# Patient Record
Sex: MALE | Race: WHITE | NOT HISPANIC OR LATINO | Employment: FULL TIME | URBAN - METROPOLITAN AREA
[De-identification: names, ages, dates, MRNs, and addresses within clinical notes are randomized per-mention and may not be internally consistent; named-entity substitution may affect disease eponyms.]

---

## 2023-06-23 ENCOUNTER — PATIENT OUTREACH (OUTPATIENT)
Dept: CARE COORDINATION | Facility: CLINIC | Age: 73
End: 2023-06-23
Payer: MEDICARE

## 2023-06-23 NOTE — PROGRESS NOTES
Spoke with patient today to get scheduled for Annual Wellness Exam. Successfully scheduled with PCP office. Scheduled for 7/28/2023 with Dr. Morillo.     I introduced myself as the Patient Navigator. I am here to help with any obstacles that are in the way of receiving the proper care. I am able to assist with appointments issues, medication coverage issues, community programs which can include help with meals, medical supplies, senior programs, housing issues and/or transportation.    I gave my direct number [phone] if anything arises in the future they may contact me directly.       Contact on behalf of [facility] direct scheduling at [phone]

## 2023-07-25 ENCOUNTER — APPOINTMENT (OUTPATIENT)
Dept: PRIMARY CARE | Facility: CLINIC | Age: 73
End: 2023-07-25
Payer: MEDICARE

## 2023-08-01 ENCOUNTER — OFFICE VISIT (OUTPATIENT)
Dept: PRIMARY CARE | Facility: CLINIC | Age: 73
End: 2023-08-01
Payer: MEDICARE

## 2023-08-01 ENCOUNTER — LAB (OUTPATIENT)
Dept: LAB | Facility: LAB | Age: 73
End: 2023-08-01
Payer: MEDICARE

## 2023-08-01 VITALS
HEIGHT: 69 IN | DIASTOLIC BLOOD PRESSURE: 68 MMHG | HEART RATE: 70 BPM | WEIGHT: 181 LBS | SYSTOLIC BLOOD PRESSURE: 121 MMHG | BODY MASS INDEX: 26.81 KG/M2 | OXYGEN SATURATION: 95 %

## 2023-08-01 DIAGNOSIS — I10 HYPERTENSION, UNSPECIFIED TYPE: Primary | ICD-10-CM

## 2023-08-01 DIAGNOSIS — I10 HYPERTENSION, UNSPECIFIED TYPE: ICD-10-CM

## 2023-08-01 DIAGNOSIS — M10.9 GOUT, UNSPECIFIED CAUSE, UNSPECIFIED CHRONICITY, UNSPECIFIED SITE: ICD-10-CM

## 2023-08-01 PROBLEM — E78.00 HYPERCHOLESTEROLEMIA: Status: ACTIVE | Noted: 2023-08-01

## 2023-08-01 PROBLEM — K80.20 CHOLELITHIASIS: Status: ACTIVE | Noted: 2023-08-01

## 2023-08-01 LAB
ALANINE AMINOTRANSFERASE (SGPT) (U/L) IN SER/PLAS: 25 U/L (ref 10–52)
ALBUMIN (G/DL) IN SER/PLAS: 4.4 G/DL (ref 3.4–5)
ALKALINE PHOSPHATASE (U/L) IN SER/PLAS: 52 U/L (ref 33–136)
ANION GAP IN SER/PLAS: 16 MMOL/L (ref 10–20)
ASPARTATE AMINOTRANSFERASE (SGOT) (U/L) IN SER/PLAS: 19 U/L (ref 9–39)
BILIRUBIN TOTAL (MG/DL) IN SER/PLAS: 1.3 MG/DL (ref 0–1.2)
CALCIUM (MG/DL) IN SER/PLAS: 9.5 MG/DL (ref 8.6–10.6)
CARBON DIOXIDE, TOTAL (MMOL/L) IN SER/PLAS: 29 MMOL/L (ref 21–32)
CHLORIDE (MMOL/L) IN SER/PLAS: 100 MMOL/L (ref 98–107)
CHOLESTEROL (MG/DL) IN SER/PLAS: 143 MG/DL (ref 0–199)
CHOLESTEROL IN HDL (MG/DL) IN SER/PLAS: 75.1 MG/DL
CHOLESTEROL/HDL RATIO: 1.9
CREATININE (MG/DL) IN SER/PLAS: 0.99 MG/DL (ref 0.5–1.3)
GFR MALE: 80 ML/MIN/1.73M2
GLUCOSE (MG/DL) IN SER/PLAS: 91 MG/DL (ref 74–99)
LDL: 53 MG/DL (ref 0–99)
POTASSIUM (MMOL/L) IN SER/PLAS: 3.9 MMOL/L (ref 3.5–5.3)
PROTEIN TOTAL: 6.4 G/DL (ref 6.4–8.2)
SODIUM (MMOL/L) IN SER/PLAS: 141 MMOL/L (ref 136–145)
TRIGLYCERIDE (MG/DL) IN SER/PLAS: 77 MG/DL (ref 0–149)
URATE (MG/DL) IN SER/PLAS: 5.5 MG/DL (ref 4–7.5)
UREA NITROGEN (MG/DL) IN SER/PLAS: 37 MG/DL (ref 6–23)
VLDL: 15 MG/DL (ref 0–40)

## 2023-08-01 PROCEDURE — 84550 ASSAY OF BLOOD/URIC ACID: CPT

## 2023-08-01 PROCEDURE — G0439 PPPS, SUBSEQ VISIT: HCPCS | Performed by: FAMILY MEDICINE

## 2023-08-01 PROCEDURE — 1170F FXNL STATUS ASSESSED: CPT | Performed by: FAMILY MEDICINE

## 2023-08-01 PROCEDURE — 36415 COLL VENOUS BLD VENIPUNCTURE: CPT

## 2023-08-01 PROCEDURE — 3074F SYST BP LT 130 MM HG: CPT | Performed by: FAMILY MEDICINE

## 2023-08-01 PROCEDURE — G0444 DEPRESSION SCREEN ANNUAL: HCPCS | Performed by: FAMILY MEDICINE

## 2023-08-01 PROCEDURE — 90715 TDAP VACCINE 7 YRS/> IM: CPT | Performed by: FAMILY MEDICINE

## 2023-08-01 PROCEDURE — 90471 IMMUNIZATION ADMIN: CPT | Performed by: FAMILY MEDICINE

## 2023-08-01 PROCEDURE — 3078F DIAST BP <80 MM HG: CPT | Performed by: FAMILY MEDICINE

## 2023-08-01 PROCEDURE — 80053 COMPREHEN METABOLIC PANEL: CPT

## 2023-08-01 PROCEDURE — 80061 LIPID PANEL: CPT

## 2023-08-01 PROCEDURE — 99213 OFFICE O/P EST LOW 20 MIN: CPT | Performed by: FAMILY MEDICINE

## 2023-08-01 RX ORDER — ACETAMINOPHEN 500 MG
TABLET ORAL
COMMUNITY

## 2023-08-01 RX ORDER — ATORVASTATIN CALCIUM 40 MG/1
1 TABLET, FILM COATED ORAL NIGHTLY
COMMUNITY
Start: 2020-09-14 | End: 2024-04-04 | Stop reason: SDUPTHER

## 2023-08-01 RX ORDER — ALLOPURINOL 100 MG/1
100 TABLET ORAL 2 TIMES DAILY
Qty: 180 TABLET | Refills: 2 | Status: SHIPPED | OUTPATIENT
Start: 2023-08-01

## 2023-08-01 RX ORDER — TADALAFIL 20 MG/1
20 TABLET ORAL DAILY PRN
COMMUNITY

## 2023-08-01 RX ORDER — DOXYCYCLINE HYCLATE 20 MG
1 TABLET ORAL 2 TIMES DAILY
COMMUNITY
Start: 2021-12-22

## 2023-08-01 RX ORDER — METRONIDAZOLE 7.5 MG/G
GEL TOPICAL
COMMUNITY

## 2023-08-01 RX ORDER — COLCHICINE 0.6 MG/1
CAPSULE ORAL
COMMUNITY
Start: 2020-11-06

## 2023-08-01 RX ORDER — TOLTERODINE TARTRATE 2 MG/1
2 TABLET, EXTENDED RELEASE ORAL DAILY
COMMUNITY

## 2023-08-01 RX ORDER — ALLOPURINOL 100 MG/1
1 TABLET ORAL 2 TIMES DAILY
COMMUNITY
Start: 2020-11-06 | End: 2023-08-01 | Stop reason: SDUPTHER

## 2023-08-01 RX ORDER — NAPROXEN SODIUM 220 MG/1
TABLET, FILM COATED ORAL
COMMUNITY
Start: 2020-09-14

## 2023-08-01 RX ORDER — CHLORTHALIDONE 25 MG/1
1 TABLET ORAL DAILY
COMMUNITY
Start: 2020-07-29 | End: 2024-04-04 | Stop reason: SDUPTHER

## 2023-08-01 RX ORDER — DOXAZOSIN 4 MG/1
4 TABLET ORAL DAILY
COMMUNITY

## 2023-08-01 RX ORDER — GLUC/MSM/COLGN2/HYAL/ANTIARTH3 375-375-20
TABLET ORAL
COMMUNITY

## 2023-08-01 ASSESSMENT — ENCOUNTER SYMPTOMS
GASTROINTESTINAL NEGATIVE: 1
MUSCULOSKELETAL NEGATIVE: 1
CONSTITUTIONAL NEGATIVE: 1
PSYCHIATRIC NEGATIVE: 1
NEUROLOGICAL NEGATIVE: 1
CARDIOVASCULAR NEGATIVE: 1
RESPIRATORY NEGATIVE: 1

## 2023-08-01 ASSESSMENT — PATIENT HEALTH QUESTIONNAIRE - PHQ9
SUM OF ALL RESPONSES TO PHQ9 QUESTIONS 1 AND 2: 0
2. FEELING DOWN, DEPRESSED OR HOPELESS: NOT AT ALL
1. LITTLE INTEREST OR PLEASURE IN DOING THINGS: NOT AT ALL

## 2023-08-01 ASSESSMENT — ACTIVITIES OF DAILY LIVING (ADL)
GROCERY_SHOPPING: INDEPENDENT
DRESSING: INDEPENDENT
DOING_HOUSEWORK: INDEPENDENT
BATHING: INDEPENDENT
TAKING_MEDICATION: INDEPENDENT
MANAGING_FINANCES: INDEPENDENT

## 2023-08-01 NOTE — PROGRESS NOTES
Subjective   Patient ID: Art Elizabeth is a 73 y.o. male.    HPI  Doing well   Review of Systems   Constitutional: Negative.    HENT: Negative.     Respiratory: Negative.     Cardiovascular: Negative.    Gastrointestinal: Negative.    Genitourinary: Negative.    Musculoskeletal: Negative.    Skin: Negative.    Neurological: Negative.    Psychiatric/Behavioral: Negative.         Objective   Physical Exam  Constitutional:       Appearance: Normal appearance.   HENT:      Head: Normocephalic and atraumatic.      Nose: Nose normal.      Mouth/Throat:      Mouth: Mucous membranes are moist.   Eyes:      Extraocular Movements: Extraocular movements intact.      Pupils: Pupils are equal, round, and reactive to light.   Cardiovascular:      Rate and Rhythm: Normal rate and regular rhythm.      Pulses: Normal pulses.      Heart sounds: Normal heart sounds.   Pulmonary:      Effort: Pulmonary effort is normal.      Breath sounds: Normal breath sounds.   Abdominal:      General: Abdomen is flat. Bowel sounds are normal.      Palpations: Abdomen is soft.   Musculoskeletal:         General: Normal range of motion.      Cervical back: Normal range of motion.   Skin:     General: Skin is warm and dry.   Neurological:      General: No focal deficit present.      Mental Status: He is alert.   Psychiatric:         Mood and Affect: Mood normal.         Behavior: Behavior normal.         Thought Content: Thought content normal.         Judgment: Judgment normal.         Assessment/Plan   There are no diagnoses linked to this encounter.

## 2023-08-25 ENCOUNTER — OFFICE (OUTPATIENT)
Dept: URBAN - METROPOLITAN AREA CLINIC 26 | Facility: CLINIC | Age: 73
End: 2023-08-25
Payer: MEDICARE

## 2023-08-25 VITALS
HEART RATE: 65 BPM | DIASTOLIC BLOOD PRESSURE: 76 MMHG | HEIGHT: 69 IN | SYSTOLIC BLOOD PRESSURE: 132 MMHG | TEMPERATURE: 97.2 F | WEIGHT: 181 LBS

## 2023-08-25 DIAGNOSIS — Z79.82 LONG TERM (CURRENT) USE OF ASPIRIN: ICD-10-CM

## 2023-08-25 DIAGNOSIS — Z86.010 PERSONAL HISTORY OF COLONIC POLYPS: ICD-10-CM

## 2023-08-25 DIAGNOSIS — I10 ESSENTIAL (PRIMARY) HYPERTENSION: ICD-10-CM

## 2023-08-25 DIAGNOSIS — K57.90 DIVERTICULOSIS OF INTESTINE, PART UNSPECIFIED, WITHOUT PERFO: ICD-10-CM

## 2023-08-25 PROCEDURE — 99203 OFFICE O/P NEW LOW 30 MIN: CPT | Performed by: NURSE PRACTITIONER

## 2023-08-25 RX ORDER — ONDANSETRON HYDROCHLORIDE 4 MG/1
TABLET, FILM COATED ORAL
Qty: 3 | Refills: 0 | Status: ACTIVE
Start: 2023-08-25

## 2023-09-05 VITALS
OXYGEN SATURATION: 97 % | DIASTOLIC BLOOD PRESSURE: 46 MMHG | SYSTOLIC BLOOD PRESSURE: 108 MMHG | HEART RATE: 62 BPM | HEIGHT: 69 IN | SYSTOLIC BLOOD PRESSURE: 108 MMHG | DIASTOLIC BLOOD PRESSURE: 48 MMHG | HEART RATE: 83 BPM | HEIGHT: 69 IN | SYSTOLIC BLOOD PRESSURE: 100 MMHG | HEART RATE: 68 BPM | OXYGEN SATURATION: 95 % | OXYGEN SATURATION: 99 % | HEART RATE: 62 BPM | DIASTOLIC BLOOD PRESSURE: 47 MMHG | DIASTOLIC BLOOD PRESSURE: 67 MMHG | HEART RATE: 63 BPM | DIASTOLIC BLOOD PRESSURE: 42 MMHG | HEART RATE: 85 BPM | SYSTOLIC BLOOD PRESSURE: 88 MMHG | RESPIRATION RATE: 14 BRPM | HEART RATE: 59 BPM | SYSTOLIC BLOOD PRESSURE: 83 MMHG | DIASTOLIC BLOOD PRESSURE: 62 MMHG | OXYGEN SATURATION: 95 % | SYSTOLIC BLOOD PRESSURE: 96 MMHG | OXYGEN SATURATION: 97 % | HEART RATE: 63 BPM | HEART RATE: 82 BPM | WEIGHT: 185 LBS | DIASTOLIC BLOOD PRESSURE: 42 MMHG | DIASTOLIC BLOOD PRESSURE: 42 MMHG | SYSTOLIC BLOOD PRESSURE: 122 MMHG | DIASTOLIC BLOOD PRESSURE: 32 MMHG | SYSTOLIC BLOOD PRESSURE: 83 MMHG | OXYGEN SATURATION: 100 % | OXYGEN SATURATION: 100 % | HEART RATE: 85 BPM | HEART RATE: 60 BPM | OXYGEN SATURATION: 98 % | SYSTOLIC BLOOD PRESSURE: 103 MMHG | SYSTOLIC BLOOD PRESSURE: 82 MMHG | SYSTOLIC BLOOD PRESSURE: 86 MMHG | HEART RATE: 62 BPM | HEART RATE: 63 BPM | OXYGEN SATURATION: 92 % | SYSTOLIC BLOOD PRESSURE: 122 MMHG | HEART RATE: 61 BPM | DIASTOLIC BLOOD PRESSURE: 46 MMHG | OXYGEN SATURATION: 98 % | TEMPERATURE: 98.2 F | SYSTOLIC BLOOD PRESSURE: 103 MMHG | RESPIRATION RATE: 9 BRPM | HEART RATE: 61 BPM | RESPIRATION RATE: 11 BRPM | OXYGEN SATURATION: 92 % | SYSTOLIC BLOOD PRESSURE: 86 MMHG | HEART RATE: 59 BPM | SYSTOLIC BLOOD PRESSURE: 88 MMHG | DIASTOLIC BLOOD PRESSURE: 36 MMHG | HEART RATE: 82 BPM | DIASTOLIC BLOOD PRESSURE: 34 MMHG | RESPIRATION RATE: 12 BRPM | DIASTOLIC BLOOD PRESSURE: 67 MMHG | RESPIRATION RATE: 11 BRPM | DIASTOLIC BLOOD PRESSURE: 47 MMHG | OXYGEN SATURATION: 99 % | SYSTOLIC BLOOD PRESSURE: 96 MMHG | OXYGEN SATURATION: 97 % | DIASTOLIC BLOOD PRESSURE: 62 MMHG | RESPIRATION RATE: 28 BRPM | SYSTOLIC BLOOD PRESSURE: 122 MMHG | HEART RATE: 68 BPM | SYSTOLIC BLOOD PRESSURE: 100 MMHG | SYSTOLIC BLOOD PRESSURE: 101 MMHG | RESPIRATION RATE: 14 BRPM | OXYGEN SATURATION: 98 % | SYSTOLIC BLOOD PRESSURE: 88 MMHG | DIASTOLIC BLOOD PRESSURE: 47 MMHG | DIASTOLIC BLOOD PRESSURE: 36 MMHG | TEMPERATURE: 98.2 F | HEART RATE: 60 BPM | DIASTOLIC BLOOD PRESSURE: 38 MMHG | RESPIRATION RATE: 12 BRPM | OXYGEN SATURATION: 99 % | HEART RATE: 59 BPM | SYSTOLIC BLOOD PRESSURE: 82 MMHG | TEMPERATURE: 98.2 F | OXYGEN SATURATION: 92 % | RESPIRATION RATE: 10 BRPM | HEART RATE: 83 BPM | RESPIRATION RATE: 9 BRPM | SYSTOLIC BLOOD PRESSURE: 83 MMHG | HEART RATE: 82 BPM | SYSTOLIC BLOOD PRESSURE: 101 MMHG | WEIGHT: 185 LBS | SYSTOLIC BLOOD PRESSURE: 96 MMHG | SYSTOLIC BLOOD PRESSURE: 91 MMHG | WEIGHT: 185 LBS | DIASTOLIC BLOOD PRESSURE: 32 MMHG | SYSTOLIC BLOOD PRESSURE: 103 MMHG | RESPIRATION RATE: 28 BRPM | DIASTOLIC BLOOD PRESSURE: 48 MMHG | DIASTOLIC BLOOD PRESSURE: 32 MMHG | RESPIRATION RATE: 10 BRPM | DIASTOLIC BLOOD PRESSURE: 46 MMHG | HEART RATE: 61 BPM | DIASTOLIC BLOOD PRESSURE: 62 MMHG | HEART RATE: 85 BPM | SYSTOLIC BLOOD PRESSURE: 82 MMHG | DIASTOLIC BLOOD PRESSURE: 48 MMHG | RESPIRATION RATE: 14 BRPM | SYSTOLIC BLOOD PRESSURE: 91 MMHG | SYSTOLIC BLOOD PRESSURE: 86 MMHG | RESPIRATION RATE: 10 BRPM | DIASTOLIC BLOOD PRESSURE: 34 MMHG | RESPIRATION RATE: 11 BRPM | HEART RATE: 83 BPM | RESPIRATION RATE: 9 BRPM | RESPIRATION RATE: 12 BRPM | DIASTOLIC BLOOD PRESSURE: 36 MMHG | OXYGEN SATURATION: 95 % | OXYGEN SATURATION: 100 % | SYSTOLIC BLOOD PRESSURE: 100 MMHG | DIASTOLIC BLOOD PRESSURE: 38 MMHG | HEIGHT: 69 IN | RESPIRATION RATE: 28 BRPM | SYSTOLIC BLOOD PRESSURE: 108 MMHG | DIASTOLIC BLOOD PRESSURE: 34 MMHG | SYSTOLIC BLOOD PRESSURE: 91 MMHG | SYSTOLIC BLOOD PRESSURE: 101 MMHG | HEART RATE: 60 BPM | DIASTOLIC BLOOD PRESSURE: 67 MMHG | DIASTOLIC BLOOD PRESSURE: 38 MMHG | HEART RATE: 68 BPM

## 2023-09-08 ENCOUNTER — AMBULATORY SURGICAL CENTER (OUTPATIENT)
Dept: URBAN - METROPOLITAN AREA SURGERY 12 | Facility: SURGERY | Age: 73
End: 2023-09-08
Payer: MEDICARE

## 2023-09-08 ENCOUNTER — OFFICE (OUTPATIENT)
Dept: URBAN - METROPOLITAN AREA PATHOLOGY 2 | Facility: PATHOLOGY | Age: 73
End: 2023-09-08
Payer: MEDICARE

## 2023-09-08 DIAGNOSIS — K63.5 POLYP OF COLON: ICD-10-CM

## 2023-09-08 DIAGNOSIS — Z86.010 PERSONAL HISTORY OF COLONIC POLYPS: ICD-10-CM

## 2023-09-08 DIAGNOSIS — K57.30 DIVERTICULOSIS OF LARGE INTESTINE WITHOUT PERFORATION OR ABS: ICD-10-CM

## 2023-09-08 DIAGNOSIS — K64.4 RESIDUAL HEMORRHOIDAL SKIN TAGS: ICD-10-CM

## 2023-09-08 DIAGNOSIS — Z09 ENCOUNTER FOR FOLLOW-UP EXAMINATION AFTER COMPLETED TREATMEN: ICD-10-CM

## 2023-09-08 PROCEDURE — 88305 TISSUE EXAM BY PATHOLOGIST: CPT | Mod: TC | Performed by: PATHOLOGY

## 2023-09-08 PROCEDURE — 45384 COLONOSCOPY W/LESION REMOVAL: CPT | Mod: 59 | Performed by: INTERNAL MEDICINE

## 2023-09-08 PROCEDURE — 45385 COLONOSCOPY W/LESION REMOVAL: CPT | Performed by: INTERNAL MEDICINE

## 2024-04-04 DIAGNOSIS — E78.00 HYPERCHOLESTEROLEMIA: ICD-10-CM

## 2024-04-04 DIAGNOSIS — I10 BENIGN ESSENTIAL HTN: ICD-10-CM

## 2024-04-06 RX ORDER — ATORVASTATIN CALCIUM 40 MG/1
40 TABLET, FILM COATED ORAL NIGHTLY
Qty: 90 TABLET | Refills: 3 | Status: SHIPPED | OUTPATIENT
Start: 2024-04-06 | End: 2025-04-06

## 2024-04-06 RX ORDER — CHLORTHALIDONE 25 MG/1
25 TABLET ORAL DAILY
Qty: 90 TABLET | Refills: 3 | Status: SHIPPED | OUTPATIENT
Start: 2024-04-06 | End: 2025-04-06

## 2024-08-08 ENCOUNTER — LAB (OUTPATIENT)
Dept: LAB | Facility: LAB | Age: 74
End: 2024-08-08
Payer: MEDICARE

## 2024-08-08 ENCOUNTER — HOSPITAL ENCOUNTER (OUTPATIENT)
Dept: RADIOLOGY | Facility: CLINIC | Age: 74
Discharge: HOME | End: 2024-08-08
Payer: MEDICARE

## 2024-08-08 ENCOUNTER — APPOINTMENT (OUTPATIENT)
Dept: PRIMARY CARE | Facility: CLINIC | Age: 74
End: 2024-08-08
Payer: MEDICARE

## 2024-08-08 VITALS
HEIGHT: 68 IN | OXYGEN SATURATION: 95 % | WEIGHT: 179 LBS | BODY MASS INDEX: 27.13 KG/M2 | HEART RATE: 74 BPM | SYSTOLIC BLOOD PRESSURE: 146 MMHG | DIASTOLIC BLOOD PRESSURE: 76 MMHG

## 2024-08-08 DIAGNOSIS — M10.9 GOUT, UNSPECIFIED CAUSE, UNSPECIFIED CHRONICITY, UNSPECIFIED SITE: ICD-10-CM

## 2024-08-08 DIAGNOSIS — M54.50 ACUTE BILATERAL LOW BACK PAIN WITHOUT SCIATICA: ICD-10-CM

## 2024-08-08 DIAGNOSIS — G47.30 SLEEP APNEA, UNSPECIFIED TYPE: ICD-10-CM

## 2024-08-08 DIAGNOSIS — I10 HYPERTENSION, UNSPECIFIED TYPE: Primary | ICD-10-CM

## 2024-08-08 DIAGNOSIS — I10 HYPERTENSION, UNSPECIFIED TYPE: ICD-10-CM

## 2024-08-08 LAB
ALBUMIN SERPL BCP-MCNC: 4.1 G/DL (ref 3.4–5)
ALP SERPL-CCNC: 60 U/L (ref 33–136)
ALT SERPL W P-5'-P-CCNC: 40 U/L (ref 10–52)
ANION GAP SERPL CALC-SCNC: 12 MMOL/L (ref 10–20)
AST SERPL W P-5'-P-CCNC: 29 U/L (ref 9–39)
BILIRUB SERPL-MCNC: 1.5 MG/DL (ref 0–1.2)
BUN SERPL-MCNC: 23 MG/DL (ref 6–23)
CALCIUM SERPL-MCNC: 10 MG/DL (ref 8.6–10.6)
CHLORIDE SERPL-SCNC: 100 MMOL/L (ref 98–107)
CHOLEST SERPL-MCNC: 149 MG/DL (ref 0–199)
CHOLESTEROL/HDL RATIO: 2
CO2 SERPL-SCNC: 33 MMOL/L (ref 21–32)
CREAT SERPL-MCNC: 0.94 MG/DL (ref 0.5–1.3)
EGFRCR SERPLBLD CKD-EPI 2021: 85 ML/MIN/1.73M*2
GLUCOSE SERPL-MCNC: 102 MG/DL (ref 74–99)
HDLC SERPL-MCNC: 76.2 MG/DL
LDLC SERPL CALC-MCNC: 58 MG/DL
NON HDL CHOLESTEROL: 73 MG/DL (ref 0–149)
POTASSIUM SERPL-SCNC: 4 MMOL/L (ref 3.5–5.3)
PROT SERPL-MCNC: 6.7 G/DL (ref 6.4–8.2)
SODIUM SERPL-SCNC: 141 MMOL/L (ref 136–145)
TRIGL SERPL-MCNC: 75 MG/DL (ref 0–149)
URATE SERPL-MCNC: 4.8 MG/DL (ref 4–7.5)
VLDL: 15 MG/DL (ref 0–40)

## 2024-08-08 PROCEDURE — 1170F FXNL STATUS ASSESSED: CPT | Performed by: FAMILY MEDICINE

## 2024-08-08 PROCEDURE — 71046 X-RAY EXAM CHEST 2 VIEWS: CPT

## 2024-08-08 PROCEDURE — 84550 ASSAY OF BLOOD/URIC ACID: CPT

## 2024-08-08 PROCEDURE — 3008F BODY MASS INDEX DOCD: CPT | Performed by: FAMILY MEDICINE

## 2024-08-08 PROCEDURE — 36415 COLL VENOUS BLD VENIPUNCTURE: CPT

## 2024-08-08 PROCEDURE — 80053 COMPREHEN METABOLIC PANEL: CPT

## 2024-08-08 PROCEDURE — 1158F ADVNC CARE PLAN TLK DOCD: CPT | Performed by: FAMILY MEDICINE

## 2024-08-08 PROCEDURE — 1159F MED LIST DOCD IN RCRD: CPT | Performed by: FAMILY MEDICINE

## 2024-08-08 PROCEDURE — 3078F DIAST BP <80 MM HG: CPT | Performed by: FAMILY MEDICINE

## 2024-08-08 PROCEDURE — 80061 LIPID PANEL: CPT

## 2024-08-08 PROCEDURE — G0439 PPPS, SUBSEQ VISIT: HCPCS | Performed by: FAMILY MEDICINE

## 2024-08-08 PROCEDURE — 1123F ACP DISCUSS/DSCN MKR DOCD: CPT | Performed by: FAMILY MEDICINE

## 2024-08-08 PROCEDURE — G0444 DEPRESSION SCREEN ANNUAL: HCPCS | Performed by: FAMILY MEDICINE

## 2024-08-08 PROCEDURE — 1036F TOBACCO NON-USER: CPT | Performed by: FAMILY MEDICINE

## 2024-08-08 PROCEDURE — 72110 X-RAY EXAM L-2 SPINE 4/>VWS: CPT

## 2024-08-08 PROCEDURE — 99497 ADVNCD CARE PLAN 30 MIN: CPT | Performed by: FAMILY MEDICINE

## 2024-08-08 PROCEDURE — 3077F SYST BP >= 140 MM HG: CPT | Performed by: FAMILY MEDICINE

## 2024-08-08 PROCEDURE — 99214 OFFICE O/P EST MOD 30 MIN: CPT | Performed by: FAMILY MEDICINE

## 2024-08-08 RX ORDER — ALLOPURINOL 100 MG/1
100 TABLET ORAL 2 TIMES DAILY
Qty: 180 TABLET | Refills: 2 | Status: SHIPPED | OUTPATIENT
Start: 2024-08-08

## 2024-08-08 RX ORDER — ASCORBIC ACID 500 MG
500 TABLET ORAL
COMMUNITY

## 2024-08-08 RX ORDER — LORATADINE 10 MG/1
10 TABLET ORAL DAILY
COMMUNITY

## 2024-08-08 ASSESSMENT — PATIENT HEALTH QUESTIONNAIRE - PHQ9
2. FEELING DOWN, DEPRESSED OR HOPELESS: NOT AT ALL
SUM OF ALL RESPONSES TO PHQ9 QUESTIONS 1 AND 2: 0
1. LITTLE INTEREST OR PLEASURE IN DOING THINGS: NOT AT ALL

## 2024-08-08 ASSESSMENT — ENCOUNTER SYMPTOMS
BACK PAIN: 1
RESPIRATORY NEGATIVE: 1
HEMATOLOGIC/LYMPHATIC NEGATIVE: 1
GASTROINTESTINAL NEGATIVE: 1
CONSTITUTIONAL NEGATIVE: 1
ARTHRALGIAS: 1
CARDIOVASCULAR NEGATIVE: 1
FREQUENCY: 1
NEUROLOGICAL NEGATIVE: 1

## 2024-08-08 ASSESSMENT — ACTIVITIES OF DAILY LIVING (ADL)
MANAGING_FINANCES: INDEPENDENT
GROCERY_SHOPPING: INDEPENDENT
DOING_HOUSEWORK: INDEPENDENT
TAKING_MEDICATION: INDEPENDENT
BATHING: INDEPENDENT
DRESSING: INDEPENDENT

## 2024-08-08 NOTE — PROGRESS NOTES
Subjective   Patient ID: Art Elizabeth is a 74 y.o. male who presents for Medicare Annual Wellness Visit Subsequent.  HPI  Umbilical hernia gallstones low back pain x 2-3 days exertional dyspnea  Patient being seen for chief complaint being addressed we also needed to review patient's past medical history due to his complex medical problems we went over his significant other medical issues individually reviewed his medications and did an overview of his past labs.  And medications  Discussed depression screen with patient for 5 min   Discussed living will and DNR with patient and spent 16 min doing so. Pts questions and concerns reviewed.   Patient having issues with sleep feels tired in the morning feels is not getting a significant amount of rest.  Needs follow-up sleep study to rule out sleep apnea    Review of Systems   Constitutional: Negative.    HENT: Negative.     Respiratory: Negative.          Exertional dyspnea   Cardiovascular: Negative.    Gastrointestinal: Negative.    Genitourinary:  Positive for frequency.   Musculoskeletal:  Positive for arthralgias and back pain.   Neurological: Negative.    Hematological: Negative.        Objective   Physical Exam  Constitutional:       Appearance: Normal appearance.   HENT:      Head: Normocephalic.   Cardiovascular:      Rate and Rhythm: Normal rate and regular rhythm.   Pulmonary:      Effort: Pulmonary effort is normal.   Musculoskeletal:         General: Normal range of motion.   Skin:     General: Skin is warm and dry.   Neurological:      Mental Status: He is alert.         Assessment/Plan   Problem List Items Addressed This Visit    None  Visit Diagnoses         Codes    Hypertension, unspecified type    -  Primary I10    Relevant Orders    Comprehensive Metabolic Panel    Lipid Panel    Gout, unspecified cause, unspecified chronicity, unspecified site     M10.9    Relevant Orders    Uric acid    Acute bilateral low back pain without sciatica     M54.50     Relevant Orders    XR chest 2 views    XR lumbar spine 2-3 views                 Ronnie Morillo,  08/08/24 8:51 AM

## 2024-08-14 PROBLEM — R79.89 ELEVATED LIVER FUNCTION TESTS: Status: ACTIVE | Noted: 2024-08-14

## 2024-08-14 PROBLEM — I25.10 ARTERIOSCLEROSIS OF CORONARY ARTERY: Chronic | Status: ACTIVE | Noted: 2024-08-14

## 2024-08-14 PROBLEM — E78.00 HYPERCHOLESTEROLEMIA: Chronic | Status: ACTIVE | Noted: 2023-08-01

## 2024-08-14 PROBLEM — C85.90 NON-HODGKIN'S LYMPHOMA (MULTI): Status: ACTIVE | Noted: 2024-08-14

## 2024-08-14 PROBLEM — R60.0 LEG EDEMA: Status: ACTIVE | Noted: 2024-08-14

## 2024-08-14 PROBLEM — F32.A DEPRESSION: Status: ACTIVE | Noted: 2024-08-14

## 2024-08-14 PROBLEM — I25.10 ARTERIOSCLEROSIS OF CORONARY ARTERY: Status: ACTIVE | Noted: 2024-08-14

## 2024-08-14 PROBLEM — R53.83 FATIGUE: Status: ACTIVE | Noted: 2024-08-14

## 2024-09-05 ENCOUNTER — TELEPHONE (OUTPATIENT)
Dept: PRIMARY CARE | Facility: CLINIC | Age: 74
End: 2024-09-05

## 2024-09-05 ENCOUNTER — OFFICE VISIT (OUTPATIENT)
Dept: CARDIOLOGY | Facility: CLINIC | Age: 74
End: 2024-09-05
Payer: MEDICARE

## 2024-09-05 VITALS
HEIGHT: 68 IN | HEART RATE: 81 BPM | SYSTOLIC BLOOD PRESSURE: 144 MMHG | WEIGHT: 182 LBS | BODY MASS INDEX: 27.58 KG/M2 | OXYGEN SATURATION: 96 % | DIASTOLIC BLOOD PRESSURE: 90 MMHG

## 2024-09-05 DIAGNOSIS — R60.0 LEG EDEMA: ICD-10-CM

## 2024-09-05 DIAGNOSIS — I10 BENIGN ESSENTIAL HTN: ICD-10-CM

## 2024-09-05 DIAGNOSIS — I25.10 ARTERIOSCLEROSIS OF CORONARY ARTERY: Primary | Chronic | ICD-10-CM

## 2024-09-05 DIAGNOSIS — I10 BENIGN ESSENTIAL HYPERTENSION: Chronic | ICD-10-CM

## 2024-09-05 DIAGNOSIS — E78.00 HYPERCHOLESTEROLEMIA: Chronic | ICD-10-CM

## 2024-09-05 DIAGNOSIS — R06.02 SOB (SHORTNESS OF BREATH) ON EXERTION: Chronic | ICD-10-CM

## 2024-09-05 PROBLEM — C85.90 NON-HODGKIN'S LYMPHOMA (MULTI): Chronic | Status: ACTIVE | Noted: 2024-08-14

## 2024-09-05 PROCEDURE — 99214 OFFICE O/P EST MOD 30 MIN: CPT | Performed by: INTERNAL MEDICINE

## 2024-09-05 PROCEDURE — 1160F RVW MEDS BY RX/DR IN RCRD: CPT | Performed by: INTERNAL MEDICINE

## 2024-09-05 PROCEDURE — 93010 ELECTROCARDIOGRAM REPORT: CPT | Performed by: INTERNAL MEDICINE

## 2024-09-05 PROCEDURE — 3077F SYST BP >= 140 MM HG: CPT | Performed by: INTERNAL MEDICINE

## 2024-09-05 PROCEDURE — 93005 ELECTROCARDIOGRAM TRACING: CPT | Performed by: INTERNAL MEDICINE

## 2024-09-05 PROCEDURE — 1123F ACP DISCUSS/DSCN MKR DOCD: CPT | Performed by: INTERNAL MEDICINE

## 2024-09-05 PROCEDURE — 3080F DIAST BP >= 90 MM HG: CPT | Performed by: INTERNAL MEDICINE

## 2024-09-05 PROCEDURE — 1036F TOBACCO NON-USER: CPT | Performed by: INTERNAL MEDICINE

## 2024-09-05 PROCEDURE — 1159F MED LIST DOCD IN RCRD: CPT | Performed by: INTERNAL MEDICINE

## 2024-09-05 PROCEDURE — 3008F BODY MASS INDEX DOCD: CPT | Performed by: INTERNAL MEDICINE

## 2024-09-05 RX ORDER — CHLORTHALIDONE 25 MG/1
25 TABLET ORAL DAILY
Qty: 90 TABLET | Refills: 3 | Status: SHIPPED | OUTPATIENT
Start: 2024-09-05 | End: 2025-09-05

## 2024-09-05 RX ORDER — ATORVASTATIN CALCIUM 40 MG/1
40 TABLET, FILM COATED ORAL NIGHTLY
Qty: 90 TABLET | Refills: 3 | Status: SHIPPED | OUTPATIENT
Start: 2024-09-05 | End: 2025-09-05

## 2024-09-05 NOTE — PROGRESS NOTES
Referred by No ref. provider found    HPI I am seeing [facility] for the first time in 2 and half years.  Overall feeling okay.  Still with fatigue.  Not exercising as much.  Still has baseline shortness of breath.  No chest pain.  Now is anticipating umbilical hernia surgery.    Past Medical History:  Problem List Items Addressed This Visit    None     Past Medical History:   Diagnosis Date    Arteriosclerosis of coronary artery 08/14/2024    Elevated  Agatston units, s/p SHAWNA to large Ramus 10/23/2020      Calculus of kidney 06/18/2021    Kidney stones, calcium oxalate    Hypercholesterolemia 08/01/2023    Dr. Lara to follow      Personal history of non-Hodgkin lymphomas 06/22/2022    History of lymphoma    Personal history of other diseases of the circulatory system     History of hypertension    Presence of coronary angioplasty implant and graft 06/22/2022    History of heart artery stent    SOB (shortness of breath) on exertion 12/16/2010    s/p CHOP 2001      Unspecified abdominal hernia without obstruction or gangrene     Hernia      Past Surgical History:  He has a past surgical history that includes Other surgical history (07/29/2020); Other surgical history (07/29/2020); Other surgical history (05/02/2022); Other surgical history (04/30/2021); and Other surgical history (04/30/2021).      Social History:  He reports that he has never smoked. He has never been exposed to tobacco smoke. He has never used smokeless tobacco. He reports that he does not drink alcohol and does not use drugs.    Family History:  No family history on file.     Allergies:  Meperidine and Meperidine (pf)    Outpatient Medications:  Current Outpatient Medications   Medication Instructions    allopurinol (ZYLOPRIM) 100 mg, oral, 2 times daily    ascorbic acid (VITAMIN C) 500 mg, oral, Daily RT    aspirin 81 mg chewable tablet oral, Daily RT    atorvastatin (LIPITOR) 40 mg, oral, Nightly    chlorthalidone (HYGROTON) 25 mg, oral, Daily     cholecalciferol (Vitamin D3) 50 mcg (2,000 unit) capsule oral    colchicine (Mitigare) 0.6 mg capsule capsule oral    doxazosin (CARDURA) 4 mg, oral, Daily    doxycycline (Periostat) 20 mg tablet 1 tablet, oral, 2 times daily    ferrous gluconate 236 mg (27 mg iron) tablet oral    loratadine (CLARITIN) 10 mg, oral, Daily    MEN'S MULTI-VITAMIN ORAL 1 tablet, oral, Daily    metroNIDAZOLE (Metrogel) 0.75 % gel apply a thin layer to face every night.    tadalafil (CIALIS) 20 mg, oral, Daily PRN    tolterodine (Detrol) 2 mg tablet 2 tablets, oral, Daily     Last Recorded Vitals:  There were no vitals filed for this visit.    Physical Exam  Patient is alert and oriented x3.  HEENT is unremarkable mucous members are moist  Neck no JVP no bruits upstrokes are full no thyromegaly  Lungs are clear bilaterally.  No wheezing crackles or rales  Heart regular rhythm normal S1-S2 there is no S3 no murmurs are heard.  Abdomen is soft bs are positive nontender nondistended no organomegaly no pulsatile masses  Extremities have no edema.  Distal pulses present palpable.  Neuro is grossly nonfocal  Skin has no rashes     Last Labs:  CBC -  Lab Results   Component Value Date    WBC 6.1 10/21/2021    HGB 14.1 10/21/2021    HCT 41.8 10/21/2021    MCV 96 10/21/2021     10/21/2021     CMP -  Lab Results   Component Value Date    CALCIUM 10.0 08/08/2024    PROT 6.7 08/08/2024    ALBUMIN 4.1 08/08/2024    AST 29 08/08/2024    ALT 40 08/08/2024    ALKPHOS 60 08/08/2024    BILITOT 1.5 (H) 08/08/2024     LIPID PANEL -   Lab Results   Component Value Date    CHOL 149 08/08/2024    HDL 76.2 08/08/2024    CHHDL 2.0 08/08/2024    VLDL 15 08/08/2024    TRIG 75 08/08/2024    NHDL 73 08/08/2024     RENAL FUNCTION PANEL -   Lab Results   Component Value Date    K 4.0 08/08/2024     Lab Results   Component Value Date    BNP 11 08/21/2020   Procedure  MET STRESS TEST [11/18/2021]: Normal cardiopulmonary response patterns to exercise. Peak VO2  max 25ml/kg/min     PFTs [07/15/2021]: No obstruction / restriction / bronchodilator response. No air trapping. DLCO normal.     PTCA [10/23/2020, Dr. Hari Ortiz]: Successful angioplasty and stenting to severe stenosis in large ramus intermedius, drug-eluting stent.      CATH [10/23/2020, Dr. Mariaelena Lara]: Severe dz in ramus intermedius involving the proximal portion and mid portion. In addition, there is a severe stenosis of the distal apical LAD prior to the terminal bifurcation. Recommendations: Dr. Ortiz will proceed with PCI and stenting of the ramus intermedius. The distal LAD prior to the terminal bifurcation will be managed medically as there is very little myocardium at risk.      EX NST [09/02/2020]: 7 min 11 sec (8.8 METs) ... Normal â€“ 55%      ONC ECHO [09/02/2020]: EF 60-65%. SD = impaired relaxation pattern. Avg GLS -17.5 indicating normal LV function.     CT / SCORING [08/28/2020] = 827.51 (LM 0, .42, LCx 0, RCA 46.09). Sequela of remote granulomatous infection in the left hemithorax.        Assessment/Plan      1. CAD. Elevated calcium score 827 units.  Catheterization 2020 revealed an 80% ramus lesion which was stented.  No change in his shortness of breath after the stenting.  He continues to have the same shortness of breath.  Cardiopulmonary stress test revealed adequate VO2 max of 25 mL/kg/min.  Normal ventilatory function.  Etiology of the shortness of breath remains unclear.  I have asked for him to have an EKG today.  Exercise nuclear stress test.       2. Hypertension.  Typically better controlled than it is now.  Have asked for him to monitor his blood pressures at home once or twice a week.  He should write these numbers down     3. Hyperlipidemia.  8/8/2024 LDL 58 HDL 76 triglycerides 75 LFTs normal     4.  Preoperative clearance he is scheduling umbilical hernia surgery.  Risk moderately elevated but acceptable.  Okay to hold aspirin 5 days prior to surgery    EKG today exercise  nuclear stress test before the surgery.  He will call 1 week after the stress test for final clearance.  Return to see me 1 year    Mariaelena Lara MD     Instructions and follow up

## 2024-09-05 NOTE — TELEPHONE ENCOUNTER
Patient is unable to schedule his sleep study due to office notes not stating why he needs a sleep study. Are you willing to addend the office notes from 8/8/24 to list the reason ?

## 2024-09-05 NOTE — PATIENT INSTRUCTIONS
1. CAD. Elevated calcium score 827 units.  Catheterization 2020 revealed an 80% ramus lesion which was stented.  No change in his shortness of breath after the stenting.  He continues to have the same shortness of breath.  Cardiopulmonary stress test revealed adequate VO2 max of 25 mL/kg/min.  Normal ventilatory function.  Etiology of the shortness of breath remains unclear.  I have asked for him to have an EKG today.  Exercise nuclear stress test.       2. Hypertension.  Typically better controlled than it is now.  Have asked for him to monitor his blood pressures at home once or twice a week.  He should write these numbers down     3. Hyperlipidemia.  8/8/2024 LDL 58 HDL 76 triglycerides 75 LFTs normal     4.  Preoperative clearance he is scheduling umbilical hernia surgery.  Risk moderately elevated but acceptable.  Okay to hold aspirin 5 days prior to surgery    EKG today exercise nuclear stress test before the surgery.  He will call 1 week after the stress test for final clearance.  Return to see me 1 year

## 2024-09-20 ENCOUNTER — HOSPITAL ENCOUNTER (OUTPATIENT)
Dept: RADIOLOGY | Facility: CLINIC | Age: 74
Discharge: HOME | End: 2024-09-20
Payer: MEDICARE

## 2024-09-20 ENCOUNTER — HOSPITAL ENCOUNTER (OUTPATIENT)
Dept: CARDIOLOGY | Facility: CLINIC | Age: 74
Discharge: HOME | End: 2024-09-20
Payer: MEDICARE

## 2024-09-20 DIAGNOSIS — R06.02 SOB (SHORTNESS OF BREATH) ON EXERTION: Chronic | ICD-10-CM

## 2024-09-20 DIAGNOSIS — I10 BENIGN ESSENTIAL HYPERTENSION: Chronic | ICD-10-CM

## 2024-09-20 DIAGNOSIS — I25.10 ARTERIOSCLEROSIS OF CORONARY ARTERY: Chronic | ICD-10-CM

## 2024-09-20 PROCEDURE — 3430000001 HC RX 343 DIAGNOSTIC RADIOPHARMACEUTICALS: Performed by: INTERNAL MEDICINE

## 2024-09-20 PROCEDURE — A9502 TC99M TETROFOSMIN: HCPCS | Performed by: INTERNAL MEDICINE

## 2024-09-20 PROCEDURE — 93017 CV STRESS TEST TRACING ONLY: CPT

## 2024-09-20 PROCEDURE — 78452 HT MUSCLE IMAGE SPECT MULT: CPT

## 2024-09-25 ENCOUNTER — PREP FOR PROCEDURE (OUTPATIENT)
Dept: SURGERY | Facility: CLINIC | Age: 74
End: 2024-09-25

## 2024-09-25 ENCOUNTER — APPOINTMENT (OUTPATIENT)
Dept: SURGERY | Facility: CLINIC | Age: 74
End: 2024-09-25
Payer: MEDICARE

## 2024-09-25 VITALS
WEIGHT: 183 LBS | HEART RATE: 75 BPM | DIASTOLIC BLOOD PRESSURE: 67 MMHG | TEMPERATURE: 97.8 F | OXYGEN SATURATION: 97 % | SYSTOLIC BLOOD PRESSURE: 129 MMHG | BODY MASS INDEX: 27.74 KG/M2 | HEIGHT: 68 IN | RESPIRATION RATE: 17 BRPM

## 2024-09-25 DIAGNOSIS — K43.9 ABDOMINAL WALL HERNIA: Primary | ICD-10-CM

## 2024-09-25 PROCEDURE — 3078F DIAST BP <80 MM HG: CPT | Performed by: SURGERY

## 2024-09-25 PROCEDURE — 1036F TOBACCO NON-USER: CPT | Performed by: SURGERY

## 2024-09-25 PROCEDURE — 3074F SYST BP LT 130 MM HG: CPT | Performed by: SURGERY

## 2024-09-25 PROCEDURE — 1159F MED LIST DOCD IN RCRD: CPT | Performed by: SURGERY

## 2024-09-25 PROCEDURE — 1123F ACP DISCUSS/DSCN MKR DOCD: CPT | Performed by: SURGERY

## 2024-09-25 PROCEDURE — 99214 OFFICE O/P EST MOD 30 MIN: CPT | Performed by: SURGERY

## 2024-09-25 PROCEDURE — 3008F BODY MASS INDEX DOCD: CPT | Performed by: SURGERY

## 2024-09-25 RX ORDER — SODIUM CHLORIDE, SODIUM LACTATE, POTASSIUM CHLORIDE, CALCIUM CHLORIDE 600; 310; 30; 20 MG/100ML; MG/100ML; MG/100ML; MG/100ML
20 INJECTION, SOLUTION INTRAVENOUS CONTINUOUS
OUTPATIENT
Start: 2024-09-25

## 2024-09-25 RX ORDER — CEFAZOLIN SODIUM 2 G/100ML
2 INJECTION, SOLUTION INTRAVENOUS ONCE
OUTPATIENT
Start: 2024-09-25 | End: 2024-09-25

## 2024-09-25 NOTE — LETTER
September 25, 2024     Ronnie Morillo,   [address]  [facility] [address] [address]    Patient: Art Elizabeth   YOB: 1950   Date of Visit: 9/25/2024       Dear Dr. Ronnie Morillo, :    Thank you for referring Art Elizabeth to me for evaluation. Below are my notes for this consultation.  If you have questions, please do not hesitate to call me. I look forward to following your patient along with you.       Sincerely,     Patrick Guaman MD      CC: No Recipients  ______________________________________________________________________________________    History Of Present Illness  HPI   The patient is a 74-year-old male with a history of cholelithiasis.  I had previously evaluated the patient in June 2022 at which time he was asymptomatic.  He also had an epigastric hernia which was reducible and asymptomatic.  The patient reports that the hernia has become larger and now causes intermittent discomfort.  He has no problems with right upper quadrant abdominal pain.  No nausea or vomiting.  No food intolerance.  No jaundice.    Past medical history:  Coronary disease status post stent placement in 2020.  Follows with Dr. Lara.    Non-Hodgkin's lymphoma treated with chemotherapy in 2001.  Inguinal hernia repair 1970  Kidney stone in 2021  Hypertension    Past Medical History  He has a past medical history of Arteriosclerosis of coronary artery (08/14/2024), Benign essential hypertension (08/01/2023), Calculus of kidney (06/18/2021), Hypercholesterolemia (08/01/2023), Non-Hodgkin's lymphoma (Multi) (08/14/2024), Personal history of non-Hodgkin lymphomas (06/22/2022), Personal history of other diseases of the circulatory system, Presence of coronary angioplasty implant and graft (06/22/2022), SOB (shortness of breath) on exertion (12/16/2010), and Unspecified abdominal hernia without obstruction or gangrene.    Surgical History  He has a past surgical history that includes Other  "surgical history (07/29/2020); Other surgical history (07/29/2020); Other surgical history (05/02/2022); Other surgical history (04/30/2021); and Other surgical history (04/30/2021).     Allergies  Meperidine and Meperidine (pf)    Social History  He reports that he has never smoked. He has never been exposed to tobacco smoke. He has never used smokeless tobacco. He reports that he does not drink alcohol and does not use drugs.    Family History  No family history on file.    Last Recorded Vitals  Blood pressure 129/67, pulse 75, temperature 36.6 °C (97.8 °F), temperature source Temporal, resp. rate 17, height 1.727 m (5' 8\"), weight 83 kg (183 lb), SpO2 97%.    Physical Exam  Constitutional: Well-developed, well-nourished, alert and oriented, no acute distress  Skin: Warm and dry, no lesions, no rashes, no jaundice  HEENT: Normocephalic, atraumatic, EOMI, no scleral icterus, eyes have no redness or swelling or discharge, external inspection of ears and nose is normal, mucous membranes moist  Neck: Soft, nontender, no mass or adenopathy  Cardiac: Regular rate and rhythm, no murmur  Chest: Patent airway, clear to auscultation, normal breath sounds with good chest expansion, no wheezes or rales or rhonchi noted, thorax symmetric  Abdomen: Nondistended, positive bowel sounds, soft, nontender, no mass.  Supraumbilical midline hernia with 5 cm diameter hernia sac which is reducible, but mildly tender.  Rectal: Not performed  Extremities: No injury, no lower extremity edema or calf tenderness  Lymphatic: No cervical adenopathy  Musculoskeletal: Range of motion intact, no joint swelling, normal strength  Neurological: Alert and oriented x3, intact sensory and motor function, no obvious focal neurologic abnormalities, normal gait  Psychological: Appropriate mood and behavior    Relevant Results       Assessment/Plan  Diagnoses and all orders for this visit:  Abdominal wall hernia    74-year-old male with a supraumbilical " midline abdominal wall hernia which is reducible, but mildly symptomatic.  The patient wishes to have the hernia repaired.  I discussed the options of open repair possibly with mesh versus robotic assisted laparoscopic repair.  Given the apparent small size of the fascial defect, I would recommend open repair.  I discussed the procedure and risks. The risks include bleeding, infection, mesh infection, recurrence, injury to surrounding structures, cardiac/pulmonary complications. If the mesh becomes infected it could require excision.  Electronic consent obtained    The patient saw Dr. Lara on September 5, 2024 who felt that he was moderately elevated but acceptable risk for hernia surgery.  May hold aspirin 5 days prior to surgery.  He did order exercise nuclear stress test which showed normal stress myocardial perfusion imaging and well-maintained left ventricular function.    Patrick Guaman MD

## 2024-09-25 NOTE — PROGRESS NOTES
History Of Present Illness  HPI   The patient is a 74-year-old male with a history of cholelithiasis.  I had previously evaluated the patient in June 2022 at which time he was asymptomatic.  He also had an epigastric hernia which was reducible and asymptomatic.  The patient reports that the hernia has become larger and now causes intermittent discomfort.  He has no problems with right upper quadrant abdominal pain.  No nausea or vomiting.  No food intolerance.  No jaundice.    Past medical history:  Coronary disease status post stent placement in 2020.  Follows with Dr. Lara.    Non-Hodgkin's lymphoma treated with chemotherapy in 2001.  Inguinal hernia repair 1970  Kidney stone in 2021  Hypertension    Past Medical History  He has a past medical history of Arteriosclerosis of coronary artery (08/14/2024), Benign essential hypertension (08/01/2023), Calculus of kidney (06/18/2021), Hypercholesterolemia (08/01/2023), Non-Hodgkin's lymphoma (Multi) (08/14/2024), Personal history of non-Hodgkin lymphomas (06/22/2022), Personal history of other diseases of the circulatory system, Presence of coronary angioplasty implant and graft (06/22/2022), SOB (shortness of breath) on exertion (12/16/2010), and Unspecified abdominal hernia without obstruction or gangrene.    Surgical History  He has a past surgical history that includes Other surgical history (07/29/2020); Other surgical history (07/29/2020); Other surgical history (05/02/2022); Other surgical history (04/30/2021); and Other surgical history (04/30/2021).     Allergies  Meperidine and Meperidine (pf)    Social History  He reports that he has never smoked. He has never been exposed to tobacco smoke. He has never used smokeless tobacco. He reports that he does not drink alcohol and does not use drugs.    Family History  No family history on file.    Last Recorded Vitals  Blood pressure 129/67, pulse 75, temperature 36.6 °C (97.8 °F), temperature source Temporal, resp.  "rate 17, height 1.727 m (5' 8\"), weight 83 kg (183 lb), SpO2 97%.    Physical Exam  Constitutional: Well-developed, well-nourished, alert and oriented, no acute distress  Skin: Warm and dry, no lesions, no rashes, no jaundice  HEENT: Normocephalic, atraumatic, EOMI, no scleral icterus, eyes have no redness or swelling or discharge, external inspection of ears and nose is normal, mucous membranes moist  Neck: Soft, nontender, no mass or adenopathy  Cardiac: Regular rate and rhythm, no murmur  Chest: Patent airway, clear to auscultation, normal breath sounds with good chest expansion, no wheezes or rales or rhonchi noted, thorax symmetric  Abdomen: Nondistended, positive bowel sounds, soft, nontender, no mass.  Supraumbilical midline hernia with 5 cm diameter hernia sac which is reducible, but mildly tender.  Rectal: Not performed  Extremities: No injury, no lower extremity edema or calf tenderness  Lymphatic: No cervical adenopathy  Musculoskeletal: Range of motion intact, no joint swelling, normal strength  Neurological: Alert and oriented x3, intact sensory and motor function, no obvious focal neurologic abnormalities, normal gait  Psychological: Appropriate mood and behavior    Relevant Results       Assessment/Plan   Diagnoses and all orders for this visit:  Abdominal wall hernia    74-year-old male with a supraumbilical midline abdominal wall hernia which is reducible, but mildly symptomatic.  The patient wishes to have the hernia repaired.  I discussed the options of open repair possibly with mesh versus robotic assisted laparoscopic repair.  Given the apparent small size of the fascial defect, I would recommend open repair.  I discussed the procedure and risks. The risks include bleeding, infection, mesh infection, recurrence, injury to surrounding structures, cardiac/pulmonary complications. If the mesh becomes infected it could require excision.  Electronic consent obtained    The patient saw Dr. Lara on " September 5, 2024 who felt that he was moderately elevated but acceptable risk for hernia surgery.  May hold aspirin 5 days prior to surgery.  He did order exercise nuclear stress test which showed normal stress myocardial perfusion imaging and well-maintained left ventricular function.    Patrick Guaman MD

## 2024-10-11 ENCOUNTER — TELEPHONE (OUTPATIENT)
Dept: SURGERY | Facility: CLINIC | Age: 74
End: 2024-10-11
Payer: MEDICARE

## 2024-10-11 NOTE — TELEPHONE ENCOUNTER
Returned patient's phone call regarding surgical questions.  I confirmed with patient that Dr. Lara asked for him to hold his Aspirin five days prior to his procedure, I discussed where patient would be able to purchase Hibiclens and how to utilize this prior to surgery.  All questions were addressed and answered and patient verbalized understanding.

## 2024-10-14 NOTE — PREPROCEDURE INSTRUCTIONS
Current Medications   Medication Instructions    allopurinol (Zyloprim) 100 mg tablet {Take in am with small sip of water    ascorbic acid (Vitamin C) 500 mg tablet Hold    aspirin 81 mg chewable tablet Held x 5 days last dose 10/10/24    atorvastatin (Lipitor) 40 mg tablet {Takes at HS    chlorthalidone (Hygroton) 25 mg tablet Take in am with small sip of water    cholecalciferol (Vitamin D3) 50 mcg (2,000 unit) capsule Hold    doxazosin (Cardura) 4 mg tablet Take in am with small sip of water    doxycycline (Periostat) 20 mg tablet Take in am with small sip of water    loratadine (Claritin) 10 mg tablet Take in am with small sip of water    MEN'S MULTI-VITAMIN ORAL Hold    metroNIDAZOLE (Metrogel) 0.75 % gel Hold    tadalafil 20 mg tablet Hold    tolterodine (Detrol) 2 mg tablet Hold         NPO Instructions:  Nothing to eat after midnight tonight        Additional Instructions:   Enter through main entrance of [facility], located at [address] [facility]. Proceed to registration, located in the back right hand corner. You will need your ID and insurance card for registration. Please ensure you have a responsible adult to drive you home.     Take a shower the morning of or night before your procedure. After you shower avoid lotions, powders, deodorants or anything applied to the skin. If you wear contacts or glasses, wear the glasses. If you do not have glasses, please bring a case for your contacts. You may wear hearing aids and dentures, bring a case for them or we will provide one. Make sure you wear something loose and comfortable. Keep in mind your surgery type and wear something that will accommodate incisions or bandages. Please remove all jewelry.     You may have up to 13 ounces of clear liquids 2 hours before* your instructed surgery time (11:45)*  to the hospital. You may take medications discussed during phone call with a small sip of water.    For further questions Kenroy ALVAREZ can be contacted  at [phone] between 7AM-3PM.

## 2024-10-15 ENCOUNTER — ANESTHESIA (OUTPATIENT)
Dept: OPERATING ROOM | Facility: HOSPITAL | Age: 74
End: 2024-10-15
Payer: MEDICARE

## 2024-10-15 ENCOUNTER — HOSPITAL ENCOUNTER (OUTPATIENT)
Facility: HOSPITAL | Age: 74
Setting detail: OUTPATIENT SURGERY
Discharge: HOME | End: 2024-10-15
Attending: SURGERY | Admitting: SURGERY
Payer: MEDICARE

## 2024-10-15 ENCOUNTER — ANESTHESIA EVENT (OUTPATIENT)
Dept: OPERATING ROOM | Facility: HOSPITAL | Age: 74
End: 2024-10-15
Payer: MEDICARE

## 2024-10-15 VITALS
SYSTOLIC BLOOD PRESSURE: 117 MMHG | TEMPERATURE: 97.2 F | DIASTOLIC BLOOD PRESSURE: 67 MMHG | OXYGEN SATURATION: 96 % | HEIGHT: 68 IN | BODY MASS INDEX: 27.58 KG/M2 | WEIGHT: 182 LBS | RESPIRATION RATE: 16 BRPM | HEART RATE: 60 BPM

## 2024-10-15 DIAGNOSIS — K43.9 ABDOMINAL WALL HERNIA: Primary | ICD-10-CM

## 2024-10-15 PROCEDURE — 7100000002 HC RECOVERY ROOM TIME - EACH INCREMENTAL 1 MINUTE: Performed by: SURGERY

## 2024-10-15 PROCEDURE — 49591 RPR AA HRN 1ST < 3 CM RDC: CPT | Performed by: SURGERY

## 2024-10-15 PROCEDURE — 2500000004 HC RX 250 GENERAL PHARMACY W/ HCPCS (ALT 636 FOR OP/ED): Mod: JZ | Performed by: SURGERY

## 2024-10-15 PROCEDURE — 2500000004 HC RX 250 GENERAL PHARMACY W/ HCPCS (ALT 636 FOR OP/ED): Performed by: ANESTHESIOLOGY

## 2024-10-15 PROCEDURE — 3700000001 HC GENERAL ANESTHESIA TIME - INITIAL BASE CHARGE: Performed by: SURGERY

## 2024-10-15 PROCEDURE — 2720000007 HC OR 272 NO HCPCS: Performed by: SURGERY

## 2024-10-15 PROCEDURE — 3700000002 HC GENERAL ANESTHESIA TIME - EACH INCREMENTAL 1 MINUTE: Performed by: SURGERY

## 2024-10-15 PROCEDURE — 2500000004 HC RX 250 GENERAL PHARMACY W/ HCPCS (ALT 636 FOR OP/ED): Performed by: ANESTHESIOLOGIST ASSISTANT

## 2024-10-15 PROCEDURE — 3600000008 HC OR TIME - EACH INCREMENTAL 1 MINUTE - PROCEDURE LEVEL THREE: Performed by: SURGERY

## 2024-10-15 PROCEDURE — 7100000010 HC PHASE TWO TIME - EACH INCREMENTAL 1 MINUTE: Performed by: SURGERY

## 2024-10-15 PROCEDURE — 2500000005 HC RX 250 GENERAL PHARMACY W/O HCPCS: Performed by: SURGERY

## 2024-10-15 PROCEDURE — 7100000001 HC RECOVERY ROOM TIME - INITIAL BASE CHARGE: Performed by: SURGERY

## 2024-10-15 PROCEDURE — 7100000009 HC PHASE TWO TIME - INITIAL BASE CHARGE: Performed by: SURGERY

## 2024-10-15 PROCEDURE — 3600000003 HC OR TIME - INITIAL BASE CHARGE - PROCEDURE LEVEL THREE: Performed by: SURGERY

## 2024-10-15 RX ORDER — SODIUM CHLORIDE 0.9 G/100ML
IRRIGANT IRRIGATION AS NEEDED
Status: DISCONTINUED | OUTPATIENT
Start: 2024-10-15 | End: 2024-10-15 | Stop reason: HOSPADM

## 2024-10-15 RX ORDER — FENTANYL CITRATE 50 UG/ML
INJECTION, SOLUTION INTRAMUSCULAR; INTRAVENOUS AS NEEDED
Status: DISCONTINUED | OUTPATIENT
Start: 2024-10-15 | End: 2024-10-15

## 2024-10-15 RX ORDER — DEXAMETHASONE SODIUM PHOSPHATE 10 MG/ML
6 INJECTION INTRAMUSCULAR; INTRAVENOUS ONCE
Status: DISCONTINUED | OUTPATIENT
Start: 2024-10-15 | End: 2024-10-15 | Stop reason: HOSPADM

## 2024-10-15 RX ORDER — HYDROCODONE BITARTRATE AND ACETAMINOPHEN 5; 325 MG/1; MG/1
1 TABLET ORAL EVERY 6 HOURS PRN
Qty: 8 TABLET | Refills: 0 | Status: SHIPPED | OUTPATIENT
Start: 2024-10-15

## 2024-10-15 RX ORDER — LIDOCAINE HCL/PF 100 MG/5ML
SYRINGE (ML) INTRAVENOUS AS NEEDED
Status: DISCONTINUED | OUTPATIENT
Start: 2024-10-15 | End: 2024-10-15

## 2024-10-15 RX ORDER — GABAPENTIN 300 MG/1
300 CAPSULE ORAL 3 TIMES DAILY PRN
Qty: 15 CAPSULE | Refills: 0 | Status: SHIPPED | OUTPATIENT
Start: 2024-10-15

## 2024-10-15 RX ORDER — ALBUTEROL SULFATE 0.83 MG/ML
2.5 SOLUTION RESPIRATORY (INHALATION) ONCE AS NEEDED
Status: DISCONTINUED | OUTPATIENT
Start: 2024-10-15 | End: 2024-10-15 | Stop reason: HOSPADM

## 2024-10-15 RX ORDER — LIDOCAINE HYDROCHLORIDE 10 MG/ML
INJECTION, SOLUTION INFILTRATION; PERINEURAL AS NEEDED
Status: DISCONTINUED | OUTPATIENT
Start: 2024-10-15 | End: 2024-10-15 | Stop reason: HOSPADM

## 2024-10-15 RX ORDER — ACETAMINOPHEN 325 MG/1
650 TABLET ORAL EVERY 4 HOURS PRN
Status: DISCONTINUED | OUTPATIENT
Start: 2024-10-15 | End: 2024-10-15 | Stop reason: HOSPADM

## 2024-10-15 RX ORDER — SODIUM CHLORIDE, SODIUM LACTATE, POTASSIUM CHLORIDE, CALCIUM CHLORIDE 600; 310; 30; 20 MG/100ML; MG/100ML; MG/100ML; MG/100ML
20 INJECTION, SOLUTION INTRAVENOUS CONTINUOUS
Status: DISCONTINUED | OUTPATIENT
Start: 2024-10-15 | End: 2024-10-15 | Stop reason: HOSPADM

## 2024-10-15 RX ORDER — KETAMINE HCL IN NACL, ISO-OSM 100MG/10ML
SYRINGE (ML) INJECTION AS NEEDED
Status: DISCONTINUED | OUTPATIENT
Start: 2024-10-15 | End: 2024-10-15

## 2024-10-15 RX ORDER — BUPIVACAINE HYDROCHLORIDE 5 MG/ML
INJECTION, SOLUTION EPIDURAL; INTRACAUDAL AS NEEDED
Status: DISCONTINUED | OUTPATIENT
Start: 2024-10-15 | End: 2024-10-15 | Stop reason: HOSPADM

## 2024-10-15 RX ORDER — CEFAZOLIN SODIUM 2 G/100ML
2 INJECTION, SOLUTION INTRAVENOUS ONCE
Status: COMPLETED | OUTPATIENT
Start: 2024-10-15 | End: 2024-10-15

## 2024-10-15 RX ORDER — PROPOFOL 10 MG/ML
INJECTION, EMULSION INTRAVENOUS AS NEEDED
Status: DISCONTINUED | OUTPATIENT
Start: 2024-10-15 | End: 2024-10-15

## 2024-10-15 RX ORDER — ONDANSETRON HYDROCHLORIDE 2 MG/ML
4 INJECTION, SOLUTION INTRAVENOUS ONCE AS NEEDED
Status: DISCONTINUED | OUTPATIENT
Start: 2024-10-15 | End: 2024-10-15 | Stop reason: HOSPADM

## 2024-10-15 RX ORDER — GLYCOPYRROLATE 0.2 MG/ML
INJECTION INTRAMUSCULAR; INTRAVENOUS AS NEEDED
Status: DISCONTINUED | OUTPATIENT
Start: 2024-10-15 | End: 2024-10-15

## 2024-10-15 SDOH — HEALTH STABILITY: MENTAL HEALTH: CURRENT SMOKER: 0

## 2024-10-15 ASSESSMENT — PAIN SCALES - GENERAL
PAIN_LEVEL: 0
PAINLEVEL_OUTOF10: 3
PAINLEVEL_OUTOF10: 3
PAINLEVEL_OUTOF10: 7
PAINLEVEL_OUTOF10: 3
PAINLEVEL_OUTOF10: 0 - NO PAIN
PAINLEVEL_OUTOF10: 0 - NO PAIN
PAINLEVEL_OUTOF10: 3
PAINLEVEL_OUTOF10: 3
PAINLEVEL_OUTOF10: 0 - NO PAIN
PAINLEVEL_OUTOF10: 3
PAINLEVEL_OUTOF10: 0 - NO PAIN
PAINLEVEL_OUTOF10: 3

## 2024-10-15 ASSESSMENT — PAIN - FUNCTIONAL ASSESSMENT
PAIN_FUNCTIONAL_ASSESSMENT: 0-10

## 2024-10-15 ASSESSMENT — COLUMBIA-SUICIDE SEVERITY RATING SCALE - C-SSRS
2. HAVE YOU ACTUALLY HAD ANY THOUGHTS OF KILLING YOURSELF?: NO
6. HAVE YOU EVER DONE ANYTHING, STARTED TO DO ANYTHING, OR PREPARED TO DO ANYTHING TO END YOUR LIFE?: NO
1. IN THE PAST MONTH, HAVE YOU WISHED YOU WERE DEAD OR WISHED YOU COULD GO TO SLEEP AND NOT WAKE UP?: NO

## 2024-10-15 ASSESSMENT — PAIN DESCRIPTION - DESCRIPTORS: DESCRIPTORS: ACHING

## 2024-10-15 NOTE — ANESTHESIA POSTPROCEDURE EVALUATION
Patient: Art Elizabeth    Procedure Summary       Date: 10/15/24 Room / Location: [facility] OR 06 / [facility]    Anesthesia Start: 1413 Anesthesia Stop:     Procedure: SUPRAUMBILICAL HERNIA OPEN REPAIR Diagnosis:       Abdominal wall hernia      (Abdominal wall hernia [K43.9])    Surgeons: Patrick Guaman MD Responsible Provider: Hardeep Hensley MD    Anesthesia Type: MAC ASA Status: 3            Anesthesia Type: MAC    Vitals Value Taken Time   BP 90/53 10/15/24 1511   Temp 36.6 °C (97.9 °F) 10/15/24 1511   Pulse 71 10/15/24 1511   Resp 16 10/15/24 1511   SpO2 99 % 10/15/24 1511       Anesthesia Post Evaluation    Patient location during evaluation: PACU  Patient participation: complete - patient cannot participate  Level of consciousness: sleepy but conscious  Pain score: 0  Pain management: adequate  Airway patency: patent  Cardiovascular status: acceptable, blood pressure returned to baseline and hemodynamically stable  Respiratory status: acceptable and face mask  Hydration status: acceptable  Postoperative Nausea and Vomiting: none        There were no known notable events for this encounter.

## 2024-10-15 NOTE — OP NOTE
SUPRAUMBILICAL HERNIA OPEN REPAIR Operative Note     Date: 10/15/2024  OR Location: [facility]    Name: Art Elizabeth, : 1950, Age: 74 y.o., MRN: [id number], Sex: male    Diagnosis  Pre-op Diagnosis      * Abdominal wall hernia [K43.9] Post-op Diagnosis     * Abdominal wall hernia [K43.9]     Procedures  SUPRAUMBILICAL HERNIA OPEN REPAIR  87615 - CO RPR AA HERNIA 1ST < 3 CM REDUCIBLE      Surgeons      * Patrick Guaman - Primary    Resident/Fellow/Other Assistant:  Surgeons and Role:  * No surgeons found with a matching role *    Procedure Summary  Anesthesia: Monitor Anesthesia Care  ASA: III  Anesthesia Staff: Anesthesiologist: Hardeep Hensley MD  CRNA: TRI Anderson-CRNA  C-AA: BLU Arce  Estimated Blood Loss: 0mL  Intra-op Medications: * Intraprocedure medication information is unavailable because the case start and end events have not been set *           Anesthesia Record               Intraprocedure I/O Totals          Intake    Dexmedetomidine 0.00 mL    The total shown is the total volume documented since Anesthesia Start was filed.    Total Intake 0 mL          Specimen:   ID Type Source Tests Collected by Time   1 : HERNIA SAC Tissue HERNIA SAC SURGICAL PATHOLOGY EXAM Patrick Guaman MD 10/15/2024 1436        Staff:   Circulator: Farrah Sainz Person: Palmer         Drains and/or Catheters: * None in log *    Tourniquet Times:         Implants:     Findings: 3 adjacent supraumbilical hernias containing omentum.  Total diameter about 2.5 cm.    Indications: Art Elizabeth is an 74 y.o. male who is having surgery for Abdominal wall hernia [K43.9].     The patient was seen in the preoperative area. The risks, benefits, complications, treatment options, non-operative alternatives, expected recovery and outcomes were discussed with the patient. The possibilities of reaction to medication, pulmonary aspiration, injury to surrounding structures, bleeding, recurrent infection, the  need for additional procedures, failure to diagnose a condition, and creating a complication requiring transfusion or operation were discussed with the patient. The patient concurred with the proposed plan, giving informed consent.  The site of surgery was properly noted/marked if necessary per policy. The patient has been actively warmed in preoperative area. Preoperative antibiotics have been ordered and given within 1 hours of incision. Venous thrombosis prophylaxis have been ordered including bilateral sequential compression devices    Procedure Details: Following informed consent the patient was taken to the op room placed in supine position.  A huddle was performed.  He was given MAC anesthetic.  Sequential compression devices were in place and functioning.  He received Ancef IV in the operating.  His abdomen is prepped and draped in sterile fashion.  A timeout was performed.  Following infiltration of local anesthetic a midline supraumbilical skin incision was made overlying the hernia.  The incision was extended through the subcutaneous tissue.  The hernia sac was identified and freed from surrounding tissue and excised.  The fascial defect  was approximately 1.5 cm in width and 1 cm in length.  Omentum was reduced.  Just superior and to the right of this hernia was a 0.5 cm diameter hernia  by a small fascial bridge.  Just to the left and inferior was a 0.8 cm diameter hernia defect.  This hernia sac was excised and omentum reduced.  The total diameter of the hernia sacs was approximately 5 cm.  The diameter of the fascial defects was approximately 2.5 cm in diameter.  The fascia surrounding the hernia defects was freed of subcutaneous fat.  The fascial defects were then closed in a transverse direction with interrupted 0 Prolene suture.  There was no tension on the closure.  Mesh was not placed.  Hemostasis appeared to be excellent.  Half percent plain Marcaine was infiltrated.  The wound was  closed in layers with interrupted 3-0 Vicryl subcutaneous sutures and a running 4-0 Vicryl subcuticular skin stitch.  A dressing was placed and the patient taken to the recovery room in satisfactory condition having tolerated the procedure well.  Counts were correct.  Complications:  None; patient tolerated the procedure well.    Disposition: PACU - hemodynamically stable.  Condition: stable       Patrick Guaman  Phone Number: [phone]

## 2024-10-15 NOTE — ANESTHESIA PREPROCEDURE EVALUATION
Patient: Art Elizabeth    Procedure Information       Date/Time: 10/15/24 1145    Procedure: SUPRAUMBILICAL HERNIA OPEN REPAIR WITH POSSIBLE MESH - Open repair of supraumbilical hernia possibly with mesh    Location: [facility] 02 / [facility]    Surgeons: Patrick Guaman MD            Relevant Problems   Anesthesia (within normal limits)      Cardiac   (+) Arteriosclerosis of coronary artery   (+) Benign essential hypertension   (+) Hypercholesterolemia      Pulmonary   (+) SOB (shortness of breath) on exertion      Neuro   (+) Depression      Liver   (+) Cholelithiasis   (+) Elevated liver function tests      Hematology   (+) Non-Hodgkin's lymphoma       Clinical information reviewed:    Allergies  Meds               NPO Detail:  NPO/Void Status  Date of Last Liquid: 10/15/24  Time of Last Liquid: 0900  Date of Last Solid: 10/14/24  Time of Last Solid: 3833         Physical Exam    Airway  Mallampati: III  TM distance: >3 FB  Neck ROM: full     Cardiovascular - normal exam     Dental - normal exam     Pulmonary - normal exam     Abdominal - normal exam             Anesthesia Plan    History of general anesthesia?: yes  History of complications of general anesthesia?: no    ASA 3     MAC     The patient is not a current smoker.    intravenous induction   Anesthetic plan and risks discussed with patient.    Plan discussed with CAA.

## 2024-10-16 ASSESSMENT — PAIN SCALES - GENERAL: PAINLEVEL_OUTOF10: 7

## 2024-10-24 LAB
LABORATORY COMMENT REPORT: NORMAL
PATH REPORT.FINAL DX SPEC: NORMAL
PATH REPORT.GROSS SPEC: NORMAL
PATH REPORT.RELEVANT HX SPEC: NORMAL
PATH REPORT.TOTAL CANCER: NORMAL

## 2024-10-29 PROBLEM — Z09 POSTOP CHECK: Status: ACTIVE | Noted: 2024-10-29

## 2024-10-30 ENCOUNTER — APPOINTMENT (OUTPATIENT)
Dept: SURGERY | Facility: CLINIC | Age: 74
End: 2024-10-30
Payer: MEDICARE

## 2024-10-30 VITALS
HEART RATE: 73 BPM | TEMPERATURE: 97.7 F | WEIGHT: 183 LBS | SYSTOLIC BLOOD PRESSURE: 117 MMHG | RESPIRATION RATE: 16 BRPM | HEIGHT: 68 IN | DIASTOLIC BLOOD PRESSURE: 64 MMHG | OXYGEN SATURATION: 93 % | BODY MASS INDEX: 27.74 KG/M2

## 2024-10-30 DIAGNOSIS — Z09 POSTOP CHECK: Primary | ICD-10-CM

## 2024-10-30 PROCEDURE — 1036F TOBACCO NON-USER: CPT | Performed by: SURGERY

## 2024-10-30 PROCEDURE — 1126F AMNT PAIN NOTED NONE PRSNT: CPT | Performed by: SURGERY

## 2024-10-30 PROCEDURE — 1159F MED LIST DOCD IN RCRD: CPT | Performed by: SURGERY

## 2024-10-30 PROCEDURE — 3074F SYST BP LT 130 MM HG: CPT | Performed by: SURGERY

## 2024-10-30 PROCEDURE — 99212 OFFICE O/P EST SF 10 MIN: CPT | Performed by: SURGERY

## 2024-10-30 PROCEDURE — 3078F DIAST BP <80 MM HG: CPT | Performed by: SURGERY

## 2024-10-30 PROCEDURE — 1123F ACP DISCUSS/DSCN MKR DOCD: CPT | Performed by: SURGERY

## 2024-10-30 PROCEDURE — 3008F BODY MASS INDEX DOCD: CPT | Performed by: SURGERY

## 2024-10-30 ASSESSMENT — PAIN SCALES - GENERAL: PAINLEVEL_OUTOF10: 0-NO PAIN

## 2024-11-12 ENCOUNTER — OFFICE VISIT (OUTPATIENT)
Dept: URGENT CARE | Age: 74
End: 2024-11-12
Payer: MEDICARE

## 2024-11-12 VITALS
TEMPERATURE: 98 F | HEART RATE: 85 BPM | SYSTOLIC BLOOD PRESSURE: 152 MMHG | WEIGHT: 185 LBS | BODY MASS INDEX: 28.13 KG/M2 | RESPIRATION RATE: 16 BRPM | OXYGEN SATURATION: 97 % | DIASTOLIC BLOOD PRESSURE: 80 MMHG

## 2024-11-12 DIAGNOSIS — H65.02 ACUTE SEROUS OTITIS MEDIA OF LEFT EAR, RECURRENCE NOT SPECIFIED: Primary | ICD-10-CM

## 2024-11-12 DIAGNOSIS — H69.91 EUSTACHIAN TUBE DYSFUNCTION, RIGHT: ICD-10-CM

## 2024-11-12 PROCEDURE — 99070 SPECIAL SUPPLIES PHYS/QHP: CPT | Performed by: NURSE PRACTITIONER

## 2024-11-12 PROCEDURE — 1123F ACP DISCUSS/DSCN MKR DOCD: CPT | Performed by: NURSE PRACTITIONER

## 2024-11-12 PROCEDURE — 99203 OFFICE O/P NEW LOW 30 MIN: CPT | Performed by: NURSE PRACTITIONER

## 2024-11-12 PROCEDURE — 3077F SYST BP >= 140 MM HG: CPT | Performed by: NURSE PRACTITIONER

## 2024-11-12 PROCEDURE — 1159F MED LIST DOCD IN RCRD: CPT | Performed by: NURSE PRACTITIONER

## 2024-11-12 PROCEDURE — 3079F DIAST BP 80-89 MM HG: CPT | Performed by: NURSE PRACTITIONER

## 2024-11-12 PROCEDURE — 1126F AMNT PAIN NOTED NONE PRSNT: CPT | Performed by: NURSE PRACTITIONER

## 2024-11-12 RX ORDER — FLUTICASONE PROPIONATE 50 MCG
1 SPRAY, SUSPENSION (ML) NASAL 2 TIMES DAILY
Qty: 1 G | Refills: 0 | Status: SHIPPED | OUTPATIENT
Start: 2024-11-12 | End: 2024-11-26

## 2024-11-12 RX ORDER — AMOXICILLIN 875 MG/1
875 TABLET, FILM COATED ORAL 2 TIMES DAILY
Qty: 20 TABLET | Refills: 0 | Status: SHIPPED | OUTPATIENT
Start: 2024-11-12 | End: 2024-11-22

## 2024-11-12 ASSESSMENT — PAIN SCALES - GENERAL: PAINLEVEL_OUTOF10: 0-NO PAIN

## 2024-11-12 ASSESSMENT — ENCOUNTER SYMPTOMS
CHILLS: 0
COUGH: 0
SORE THROAT: 0

## 2024-11-13 NOTE — PROGRESS NOTES
Subjective   Patient ID: Art Elizabeth is a 74 y.o. male. They present today with a chief complaint of Earache (Right earache and pressure X 5 days. ).    History of Present Illness    Earache   Pertinent negatives include no coughing, ear discharge or sore throat.       Patient presents to urgent care for complaints of right ear pressure for the past 5 days.  Patient states today he started to develop pain and pressure in his left ear as well.  He reports some postnasal drip and had a cough about a week ago.    Past Medical History  Allergies as of 11/12/2024 - Reviewed 11/12/2024   Allergen Reaction Noted    Meperidine Anaphylaxis, Other, and Unknown 12/21/2015    Meperidine (pf) Shortness of breath 05/04/2007       (Not in a hospital admission)       Past Medical History:   Diagnosis Date    Arteriosclerosis of coronary artery 08/14/2024    Elevated  Agatston units, s/p SHAWNA to large Ramus 10/23/2020      Benign essential hypertension 08/01/2023    Calculus of kidney 06/18/2021    Kidney stones, calcium oxalate    Hypercholesterolemia 08/01/2023    Dr. Lara to follow      Non-Hodgkin's lymphoma 08/14/2024    s/p [facility] rx      Personal history of non-Hodgkin lymphomas 06/22/2022    History of lymphoma    Personal history of other diseases of the circulatory system     History of hypertension    Presence of coronary angioplasty implant and graft 06/22/2022    History of heart artery stent    SOB (shortness of breath) on exertion 12/16/2010    s/p CHOP 2001      Unspecified abdominal hernia without obstruction or gangrene     Hernia       Past Surgical History:   Procedure Laterality Date    OTHER SURGICAL HISTORY  07/29/2020    Fine needle aspiration biopsy    OTHER SURGICAL HISTORY  07/29/2020    Insertion of prostatic urethral lift implant    OTHER SURGICAL HISTORY  05/02/2022    Hernia repair    OTHER SURGICAL HISTORY  04/30/2021    Cardiac catheterization    OTHER SURGICAL HISTORY  04/30/2021     Percutaneous transluminal coronary angioplasty        reports that he has never smoked. He has never been exposed to tobacco smoke. He has never used smokeless tobacco. He reports that he does not drink alcohol and does not use drugs.    Review of Systems  Review of Systems   Constitutional:  Negative for chills.   HENT:  Positive for congestion, ear pain and postnasal drip. Negative for ear discharge and sore throat.    Respiratory:  Negative for cough.                                   Objective    Vitals:    11/12/24 1909   BP: 152/80   Pulse: 85   Resp: 16   Temp: 36.7 °C (98 °F)   SpO2: 97%   Weight: 83.9 kg (185 lb)     No LMP for male patient.    Physical Exam  Vitals reviewed.   Constitutional:       Appearance: Normal appearance.   HENT:      Head: Normocephalic.      Right Ear: Ear canal and external ear normal. Tympanic membrane is retracted. Tympanic membrane is not injected or erythematous.      Left Ear: Ear canal and external ear normal. A middle ear effusion is present. Tympanic membrane is erythematous.      Mouth/Throat:      Pharynx: Postnasal drip present.   Cardiovascular:      Rate and Rhythm: Normal rate and regular rhythm.      Heart sounds: Normal heart sounds.   Pulmonary:      Effort: Pulmonary effort is normal.      Breath sounds: Normal breath sounds and air entry.   Musculoskeletal:      Cervical back: Neck supple.   Skin:     General: Skin is warm and dry.   Neurological:      Mental Status: He is alert.         Procedures    Point of Care Test & Imaging Results from this visit  No results found for this visit on 11/12/24.   No results found.    Diagnostic study results (if any) were reviewed by COLLIN Daniels.    Assessment/Plan   Allergies, medications, history, and pertinent labs/EKGs/Imaging reviewed by COLLIN Daniels.     Medical Decision Making  Will start patient on amoxicillin and Flonase.  Patient was told if symptoms are not improving or worsening in  the next week recommend following up with PCP or return to urgent care if needed.    Orders and Diagnoses  Diagnoses and all orders for this visit:  Acute serous otitis media of left ear, recurrence not specified  -     amoxicillin (Amoxil) 875 mg tablet; Take 1 tablet (875 mg) by mouth 2 times a day for 10 days.  Eustachian tube dysfunction, right  -     fluticasone (Flonase) 50 mcg/actuation nasal spray; Administer 1 spray into each nostril 2 times a day for 14 days. Shake gently. Before first use, prime pump. After use, clean tip and replace cap.      Medical Admin Record      Patient disposition: Home    Electronically signed by COLLIN Daniels  7:21 PM

## 2024-11-18 ENCOUNTER — APPOINTMENT (OUTPATIENT)
Dept: PRIMARY CARE | Facility: CLINIC | Age: 74
End: 2024-11-18
Payer: MEDICARE

## 2024-11-19 ENCOUNTER — OFFICE VISIT (OUTPATIENT)
Dept: PRIMARY CARE | Facility: CLINIC | Age: 74
End: 2024-11-19
Payer: MEDICARE

## 2024-11-19 VITALS
WEIGHT: 184 LBS | SYSTOLIC BLOOD PRESSURE: 154 MMHG | BODY MASS INDEX: 27.89 KG/M2 | HEIGHT: 68 IN | OXYGEN SATURATION: 96 % | HEART RATE: 67 BPM | DIASTOLIC BLOOD PRESSURE: 76 MMHG

## 2024-11-19 DIAGNOSIS — H65.91 RIGHT OTITIS MEDIA WITH EFFUSION: Primary | ICD-10-CM

## 2024-11-19 PROCEDURE — 99213 OFFICE O/P EST LOW 20 MIN: CPT | Performed by: FAMILY MEDICINE

## 2024-11-19 PROCEDURE — 3078F DIAST BP <80 MM HG: CPT | Performed by: FAMILY MEDICINE

## 2024-11-19 PROCEDURE — 3008F BODY MASS INDEX DOCD: CPT | Performed by: FAMILY MEDICINE

## 2024-11-19 PROCEDURE — 1158F ADVNC CARE PLAN TLK DOCD: CPT | Performed by: FAMILY MEDICINE

## 2024-11-19 PROCEDURE — 1123F ACP DISCUSS/DSCN MKR DOCD: CPT | Performed by: FAMILY MEDICINE

## 2024-11-19 PROCEDURE — 3077F SYST BP >= 140 MM HG: CPT | Performed by: FAMILY MEDICINE

## 2024-11-19 RX ORDER — METHYLPREDNISOLONE 4 MG/1
TABLET ORAL
Qty: 21 TABLET | Refills: 0 | Status: SHIPPED | OUTPATIENT
Start: 2024-11-19 | End: 2024-11-26

## 2024-11-19 ASSESSMENT — ENCOUNTER SYMPTOMS
MUSCULOSKELETAL NEGATIVE: 1
RESPIRATORY NEGATIVE: 1
CONSTITUTIONAL NEGATIVE: 1
FACIAL SWELLING: 0
SINUS PRESSURE: 0

## 2024-11-19 NOTE — PROGRESS NOTES
Subjective   Patient ID: Art Elizabeth is a 74 y.o. male who presents for Hearing Problem (Right ear).  HPI  Harlan r patient has diminished hearing in the right ear feels like there is fluid in there was in the urgent care gave him antibiotics without any resolution  Review of Systems   Constitutional: Negative.    HENT:  Positive for hearing loss and postnasal drip. Negative for congestion, facial swelling and sinus pressure.    Respiratory: Negative.     Musculoskeletal: Negative.        Objective   Physical Exam  Constitutional:       Appearance: Normal appearance.   HENT:      Ears:      Comments: Patient has serous otitis media of the right ear     Nose: Nose normal.      Mouth/Throat:      Mouth: Mucous membranes are moist.   Cardiovascular:      Rate and Rhythm: Normal rate and regular rhythm.   Neurological:      Mental Status: He is alert.         Assessment/Plan   Problem List Items Addressed This Visit    None  Visit Diagnoses         Codes    Right otitis media with effusion    -  Primary H65.91    Relevant Medications    methylPREDNISolone (Medrol Dospak) 4 mg tablets    Other Relevant Orders    Referral to ENT                 Ronnie Morillo DO 11/19/24 2:22 PM

## 2025-01-06 ENCOUNTER — APPOINTMENT (OUTPATIENT)
Dept: OTOLARYNGOLOGY | Facility: CLINIC | Age: 75
End: 2025-01-06
Payer: MEDICARE

## 2025-01-06 ENCOUNTER — APPOINTMENT (OUTPATIENT)
Dept: AUDIOLOGY | Facility: CLINIC | Age: 75
End: 2025-01-06
Payer: MEDICARE

## 2025-05-07 ENCOUNTER — APPOINTMENT (OUTPATIENT)
Dept: PRIMARY CARE | Facility: CLINIC | Age: 75
End: 2025-05-07
Payer: MEDICARE

## 2025-05-15 ENCOUNTER — APPOINTMENT (OUTPATIENT)
Dept: PRIMARY CARE | Facility: CLINIC | Age: 75
End: 2025-05-15
Payer: MEDICARE

## 2025-05-15 VITALS
HEART RATE: 80 BPM | SYSTOLIC BLOOD PRESSURE: 113 MMHG | DIASTOLIC BLOOD PRESSURE: 56 MMHG | OXYGEN SATURATION: 94 % | WEIGHT: 182.9 LBS | BODY MASS INDEX: 28.71 KG/M2 | HEIGHT: 67 IN | RESPIRATION RATE: 16 BRPM

## 2025-05-15 DIAGNOSIS — S46.911A STRAIN OF RIGHT SHOULDER, INITIAL ENCOUNTER: Primary | ICD-10-CM

## 2025-05-15 DIAGNOSIS — M54.50 CHRONIC MIDLINE LOW BACK PAIN WITHOUT SCIATICA: ICD-10-CM

## 2025-05-15 DIAGNOSIS — G89.29 CHRONIC MIDLINE LOW BACK PAIN WITHOUT SCIATICA: ICD-10-CM

## 2025-05-15 DIAGNOSIS — G56.03 BILATERAL CARPAL TUNNEL SYNDROME: ICD-10-CM

## 2025-05-15 PROCEDURE — 3074F SYST BP LT 130 MM HG: CPT | Performed by: INTERNAL MEDICINE

## 2025-05-15 PROCEDURE — 1123F ACP DISCUSS/DSCN MKR DOCD: CPT | Performed by: INTERNAL MEDICINE

## 2025-05-15 PROCEDURE — 1036F TOBACCO NON-USER: CPT | Performed by: INTERNAL MEDICINE

## 2025-05-15 PROCEDURE — 99212 OFFICE O/P EST SF 10 MIN: CPT | Performed by: INTERNAL MEDICINE

## 2025-05-15 PROCEDURE — 1159F MED LIST DOCD IN RCRD: CPT | Performed by: INTERNAL MEDICINE

## 2025-05-15 PROCEDURE — 3078F DIAST BP <80 MM HG: CPT | Performed by: INTERNAL MEDICINE

## 2025-05-15 RX ORDER — LORAZEPAM 0.5 MG/1
0.5 TABLET ORAL
COMMUNITY
Start: 2024-11-27 | End: 2025-05-15 | Stop reason: WASHOUT

## 2025-05-15 ASSESSMENT — ENCOUNTER SYMPTOMS: ARTHRALGIAS: 1

## 2025-05-15 NOTE — PROGRESS NOTES
"Subjective   Patient ID: Art Elizabeth is a 75 y.o. male who presents for Establish Care.    Pt presents to get established from Dr. Morillo.    PMH:  -HTN: Controlled with chlorthalidone.  -CAD: Had a stent placed. Sees Dr. Lara.  -Lymphoma: Treated for this 20 years ago at [facility].  -BPH: Had a urolift done but doesn't think its helped. Still taking doxazosin and tolterodine because otherwise he has to urinate every hour. [place]. Serra [place] [facility] [place] (although they are retiring so pt will have another one taking over next visit).  -Skin cancer: Had a Mohs procedure done along the R side of his face. Goes to [facility] in [place] once a year.  -R shoulder pain: Sees a Dr. Pruitt for this and was told he has arthritis. Has gotten injections in the past. Told last time he should consider PT or shoulder replacement. PT helped resolve pain, although pt notes he fell recently.  -Gout: Had a flare up once over 10 years ago. Took allopurinol for some time after but has since stopped.  -Rosacea: Takes doxycycline and metronidazole for this.    Was hopping with his granddaughter and tripped on the sidewalk and fell onto his R shoulder and hurt it. Pain still present today but is better. Hurts with certain ways pt moves it. Has taken tylenol for this    Has had low back pain that in particularly worsens when he is standing. Had an XR last year done for this.    Has numbness in his hands that occurs at night.        Review of Systems   Musculoskeletal:  Positive for arthralgias.       /56 (BP Location: Left arm, Patient Position: Sitting)   Pulse 80   Resp 16   Ht 1.708 m (5' 7.25\")   Wt 83 kg (182 lb 14.4 oz)   SpO2 94%   BMI 28.43 kg/m²   Objective   Physical Exam  Constitutional:       General: He is not in acute distress.     Appearance: He is not ill-appearing, toxic-appearing or diaphoretic.   HENT:      Head: Normocephalic and atraumatic.   Eyes:      Conjunctiva/sclera: Conjunctivae normal. "   Musculoskeletal:         General: Tenderness (Limited ROM of R shoulder) present.      Comments: Positive phalen's test   Neurological:      Mental Status: He is alert.         Assessment/Plan   Problem List Items Addressed This Visit    None  Visit Diagnoses         Codes      Strain of right shoulder, initial encounter    -  Primary S46.911A      Chronic midline low back pain without sciatica     M54.50, G89.29      Bilateral carpal tunnel syndrome     G56.03        -Pt w/ R shoulder pain after he fell onto it. Pain still present but is improving. Advised to stretch and apply ice/heat. Pt has a gym he goes to with trainers through [facility] [place]; will ask them for help with stretching.  -Back pain likely related to severe degenerative changes (seen on XR from 8/2024). Pt to also check with trainers about stretches/strengthening of back muscles for this. If no improvement to let me know for PT referral.  -Numbness in hands concerning for carpal tunnel. Advised to wear wrist splints at night. Pt states he already has them and will give it a try.  -Will see back for MAW in July.         Adeel Adames MD 05/15/25 4:03 PM

## 2025-07-14 ENCOUNTER — APPOINTMENT (OUTPATIENT)
Dept: PRIMARY CARE | Facility: CLINIC | Age: 75
End: 2025-07-14
Payer: MEDICARE

## 2025-07-14 VITALS
RESPIRATION RATE: 12 BRPM | HEART RATE: 73 BPM | SYSTOLIC BLOOD PRESSURE: 103 MMHG | OXYGEN SATURATION: 96 % | DIASTOLIC BLOOD PRESSURE: 60 MMHG | HEIGHT: 68 IN | BODY MASS INDEX: 27.34 KG/M2 | WEIGHT: 180.4 LBS

## 2025-07-14 DIAGNOSIS — I25.10 ARTERIOSCLEROSIS OF CORONARY ARTERY: Chronic | ICD-10-CM

## 2025-07-14 DIAGNOSIS — R06.09 DYSPNEA ON EXERTION: ICD-10-CM

## 2025-07-14 DIAGNOSIS — Z00.00 MEDICARE ANNUAL WELLNESS VISIT, SUBSEQUENT: Primary | ICD-10-CM

## 2025-07-14 PROBLEM — C85.90 NON-HODGKIN'S LYMPHOMA: Chronic | Status: RESOLVED | Noted: 2024-08-14 | Resolved: 2025-07-14

## 2025-07-14 PROCEDURE — G0439 PPPS, SUBSEQ VISIT: HCPCS | Performed by: INTERNAL MEDICINE

## 2025-07-14 PROCEDURE — 1036F TOBACCO NON-USER: CPT | Performed by: INTERNAL MEDICINE

## 2025-07-14 PROCEDURE — 1170F FXNL STATUS ASSESSED: CPT | Performed by: INTERNAL MEDICINE

## 2025-07-14 PROCEDURE — 3078F DIAST BP <80 MM HG: CPT | Performed by: INTERNAL MEDICINE

## 2025-07-14 PROCEDURE — 3074F SYST BP LT 130 MM HG: CPT | Performed by: INTERNAL MEDICINE

## 2025-07-14 PROCEDURE — 1159F MED LIST DOCD IN RCRD: CPT | Performed by: INTERNAL MEDICINE

## 2025-07-14 PROCEDURE — 1158F ADVNC CARE PLAN TLK DOCD: CPT | Performed by: INTERNAL MEDICINE

## 2025-07-14 PROCEDURE — 99213 OFFICE O/P EST LOW 20 MIN: CPT | Performed by: INTERNAL MEDICINE

## 2025-07-14 ASSESSMENT — ACTIVITIES OF DAILY LIVING (ADL)
DOING_HOUSEWORK: INDEPENDENT
GROCERY_SHOPPING: INDEPENDENT
BATHING: INDEPENDENT
DRESSING: INDEPENDENT
TAKING_MEDICATION: INDEPENDENT
MANAGING_FINANCES: INDEPENDENT

## 2025-07-14 ASSESSMENT — ENCOUNTER SYMPTOMS
CONSTIPATION: 0
LOSS OF SENSATION IN FEET: 1
DIZZINESS: 0
HEADACHES: 1
BLOOD IN STOOL: 0
DEPRESSION: 0
OCCASIONAL FEELINGS OF UNSTEADINESS: 0
SLEEP DISTURBANCE: 0
FATIGUE: 1
SHORTNESS OF BREATH: 1

## 2025-07-14 NOTE — ASSESSMENT & PLAN NOTE
-Sees Dr. Lara for this. Hx stent placement.  -On ASA and statin. Will recheck lipids.  Orders:    Comprehensive metabolic panel; Future    CBC; Future    Lipid panel; Future

## 2025-07-14 NOTE — PROGRESS NOTES
Subjective   Reason for Visit: Art Elizabeth is an 75 y.o. male here for a Medicare Wellness visit.     Past Medical, Surgical, and Family History reviewed and updated in chart.    Reviewed all medications by prescribing practitioner or clinical pharmacist (such as prescriptions, OTCs, herbal therapies and supplements) and documented in the medical record.    Pt here for MAW.    Shoulder pain improved with exercises/stretching. Is working with a  at the gym to help with this.    Has been using wrist splints at night. Numbness improving but not resolved.    Sleeps 7-8 hours a night. Despite this doesn't wake up feeling well rested.    Has been dealing with SOB most of his life. Remembers in grade school he couldn't run as fast or as far as everyone else as a result. Results didn't improve after he stopped smoking pipe tobacco in the 90s. Had a stent placed as well which also didn't improve symptoms. Denies coughing or wheezing. Does deal with nasal congestion and drainage. Breathing doesn't worsen from laying down flat. Has had PFTs done as well that were normal.        Patient Care Team:  Adeel Adames MD as PCP - General (Internal Medicine)  Ronnie Morillo DO as PCP - O Medicare Advantage PCP  Patrick Guaman MD as Surgeon (General Surgery)  Adeel Adames MD as Consulting Physician (Internal Medicine)  Ricardo Serra MD as Referring Physician (Urology)  Mariaelena Lara MD as Consulting Physician (Cardiology)  Ricardo Serra MD as Referring Physician (Urology)     Pt is not considered to be at high risk of opioid abuse after reviewing chart.    Review of Systems   Constitutional:  Positive for fatigue.   Respiratory:  Positive for shortness of breath.    Cardiovascular:  Negative for chest pain.   Gastrointestinal:  Negative for blood in stool and constipation.   Neurological:  Positive for headaches (occasional). Negative for dizziness.   Psychiatric/Behavioral:  Negative for sleep disturbance.   "      Objective   Vitals:  /60 (BP Location: Left arm, Patient Position: Sitting)   Pulse 73   Resp 12   Ht 1.715 m (5' 7.5\")   Wt 81.8 kg (180 lb 6.4 oz)   SpO2 96%   BMI 27.84 kg/m²       Physical Exam  Constitutional:       General: He is not in acute distress.     Appearance: He is not ill-appearing, toxic-appearing or diaphoretic.   HENT:      Head: Normocephalic and atraumatic.   Eyes:      Conjunctiva/sclera: Conjunctivae normal.   Cardiovascular:      Rate and Rhythm: Normal rate and regular rhythm.      Heart sounds: No murmur heard.     No friction rub. No gallop.   Pulmonary:      Effort: Pulmonary effort is normal. No respiratory distress.      Breath sounds: No stridor. No wheezing, rhonchi or rales.   Abdominal:      General: Abdomen is flat. Bowel sounds are normal. There is no distension.      Palpations: Abdomen is soft.      Tenderness: There is no abdominal tenderness. There is no guarding.   Neurological:      Mental Status: He is alert.         Assessment/Plan   Assessment & Plan  Medicare annual wellness visit, subsequent         Arteriosclerosis of coronary artery  -Sees Dr. Lara for this. Hx stent placement.  -On ASA and statin. Will recheck lipids.  Orders:    Comprehensive metabolic panel; Future    CBC; Future    Lipid panel; Future    Dyspnea on exertion  -Present for most of patient's life. Has worsened over the years. Used to smoke pipe tobacco but quit 30 years ago. Had a stent placed previously in his heart which didn't improve symptoms.  Nuclear stress test from last year negative. CXR for this w/o clear findings to explain symptoms as well (most recent from 8/2024). Negative PFTs from 2021. No signs/symptoms of CHF.  -Will have pt evaluated by pulmonology next. Pt agreeable.  Orders:    Referral to Pulmonology; Future         Advance Directives Discussion  Advanced Care Planning (including a Living Will, Healthcare POA, as well as specific end of life choices and/or " directives), was discussed with the patient and/or surrogate, voluntarily, and details of that discussion documented in the Problem List (under Advanced Directives Discussion) of the medical record.  Pt has a living will and HCPOA. Contacts up to date in chart with wife as primary, and both sons as alternatives. Pt confirms he wishes to be full code.   (~16 min spent discussing above)

## 2025-07-17 LAB
ALBUMIN SERPL-MCNC: 4 G/DL (ref 3.6–5.1)
ALP SERPL-CCNC: 44 U/L (ref 35–144)
ALT SERPL-CCNC: 21 U/L (ref 9–46)
ANION GAP SERPL CALCULATED.4IONS-SCNC: 8 MMOL/L (CALC) (ref 7–17)
AST SERPL-CCNC: 20 U/L (ref 10–35)
BILIRUB SERPL-MCNC: 0.9 MG/DL (ref 0.2–1.2)
BUN SERPL-MCNC: 25 MG/DL (ref 7–25)
CALCIUM SERPL-MCNC: 9.4 MG/DL (ref 8.6–10.3)
CHLORIDE SERPL-SCNC: 103 MMOL/L (ref 98–110)
CHOLEST SERPL-MCNC: 147 MG/DL
CHOLEST/HDLC SERPL: 2 (CALC)
CO2 SERPL-SCNC: 30 MMOL/L (ref 20–32)
CREAT SERPL-MCNC: 0.93 MG/DL (ref 0.7–1.28)
EGFRCR SERPLBLD CKD-EPI 2021: 86 ML/MIN/1.73M2
ERYTHROCYTE [DISTWIDTH] IN BLOOD BY AUTOMATED COUNT: 11.7 % (ref 11–15)
GLUCOSE SERPL-MCNC: 95 MG/DL (ref 65–99)
HCT VFR BLD AUTO: 42.7 % (ref 38.5–50)
HDLC SERPL-MCNC: 74 MG/DL
HGB BLD-MCNC: 14.2 G/DL (ref 13.2–17.1)
LDLC SERPL CALC-MCNC: 59 MG/DL (CALC)
MCH RBC QN AUTO: 32.1 PG (ref 27–33)
MCHC RBC AUTO-ENTMCNC: 33.3 G/DL (ref 32–36)
MCV RBC AUTO: 96.4 FL (ref 80–100)
NONHDLC SERPL-MCNC: 73 MG/DL (CALC)
PLATELET # BLD AUTO: 185 THOUSAND/UL (ref 140–400)
PMV BLD REES-ECKER: 11 FL (ref 7.5–12.5)
POTASSIUM SERPL-SCNC: 3.7 MMOL/L (ref 3.5–5.3)
PROT SERPL-MCNC: 6.1 G/DL (ref 6.1–8.1)
RBC # BLD AUTO: 4.43 MILLION/UL (ref 4.2–5.8)
SODIUM SERPL-SCNC: 141 MMOL/L (ref 135–146)
TRIGL SERPL-MCNC: 63 MG/DL
WBC # BLD AUTO: 4.2 THOUSAND/UL (ref 3.8–10.8)

## 2025-08-14 ENCOUNTER — OFFICE VISIT (OUTPATIENT)
Dept: CARDIOLOGY | Facility: CLINIC | Age: 75
End: 2025-08-14
Payer: MEDICARE

## 2025-08-14 VITALS
DIASTOLIC BLOOD PRESSURE: 78 MMHG | SYSTOLIC BLOOD PRESSURE: 122 MMHG | BODY MASS INDEX: 27.93 KG/M2 | OXYGEN SATURATION: 96 % | WEIGHT: 181 LBS | HEART RATE: 94 BPM

## 2025-08-14 DIAGNOSIS — R06.02 SOB (SHORTNESS OF BREATH) ON EXERTION: Chronic | ICD-10-CM

## 2025-08-14 DIAGNOSIS — E78.00 HYPERCHOLESTEROLEMIA: Chronic | ICD-10-CM

## 2025-08-14 DIAGNOSIS — I25.10 ARTERIOSCLEROSIS OF CORONARY ARTERY: Primary | Chronic | ICD-10-CM

## 2025-08-14 DIAGNOSIS — I10 BENIGN ESSENTIAL HYPERTENSION: Chronic | ICD-10-CM

## 2025-08-14 DIAGNOSIS — I10 BENIGN ESSENTIAL HTN: ICD-10-CM

## 2025-08-14 PROBLEM — Z09 POSTOP CHECK: Status: RESOLVED | Noted: 2024-10-29 | Resolved: 2025-08-14

## 2025-08-14 PROBLEM — R42 DISEQUILIBRIUM: Status: ACTIVE | Noted: 2025-08-14

## 2025-08-14 PROBLEM — H91.8X3 ASYMMETRICAL HEARING LOSS: Status: ACTIVE | Noted: 2025-08-14

## 2025-08-14 PROBLEM — K80.20 CHOLELITHIASIS: Status: RESOLVED | Noted: 2023-08-01 | Resolved: 2025-08-14

## 2025-08-14 PROCEDURE — 3078F DIAST BP <80 MM HG: CPT | Performed by: INTERNAL MEDICINE

## 2025-08-14 PROCEDURE — 93005 ELECTROCARDIOGRAM TRACING: CPT | Performed by: INTERNAL MEDICINE

## 2025-08-14 PROCEDURE — 93010 ELECTROCARDIOGRAM REPORT: CPT | Performed by: INTERNAL MEDICINE

## 2025-08-14 PROCEDURE — 99212 OFFICE O/P EST SF 10 MIN: CPT | Mod: 25

## 2025-08-14 PROCEDURE — 3074F SYST BP LT 130 MM HG: CPT | Performed by: INTERNAL MEDICINE

## 2025-08-14 PROCEDURE — 1160F RVW MEDS BY RX/DR IN RCRD: CPT | Performed by: INTERNAL MEDICINE

## 2025-08-14 PROCEDURE — 99214 OFFICE O/P EST MOD 30 MIN: CPT | Performed by: INTERNAL MEDICINE

## 2025-08-14 PROCEDURE — 1159F MED LIST DOCD IN RCRD: CPT | Performed by: INTERNAL MEDICINE

## 2025-08-14 RX ORDER — CHLORTHALIDONE 25 MG/1
25 TABLET ORAL DAILY
Qty: 90 TABLET | Refills: 3 | Status: SHIPPED | OUTPATIENT
Start: 2025-08-14 | End: 2026-08-14

## 2025-08-14 RX ORDER — ATORVASTATIN CALCIUM 40 MG/1
40 TABLET, FILM COATED ORAL NIGHTLY
Qty: 90 TABLET | Refills: 3 | Status: SHIPPED | OUTPATIENT
Start: 2025-08-14 | End: 2026-08-14

## 2025-08-17 LAB
ATRIAL RATE: 78 BPM
P AXIS: 63 DEGREES
P OFFSET: 159 MS
P ONSET: 103 MS
PR INTERVAL: 238 MS
Q ONSET: 222 MS
QRS COUNT: 13 BEATS
QRS DURATION: 94 MS
QT INTERVAL: 368 MS
QTC CALCULATION(BAZETT): 419 MS
QTC FREDERICIA: 401 MS
R AXIS: 28 DEGREES
T AXIS: 54 DEGREES
T OFFSET: 406 MS
VENTRICULAR RATE: 78 BPM

## 2025-08-21 ENCOUNTER — APPOINTMENT (OUTPATIENT)
Dept: CARDIOLOGY | Facility: CLINIC | Age: 75
End: 2025-08-21
Payer: MEDICARE

## 2025-08-26 ENCOUNTER — APPOINTMENT (OUTPATIENT)
Facility: CLINIC | Age: 75
End: 2025-08-26
Payer: MEDICARE

## 2025-08-26 VITALS
BODY MASS INDEX: 27.65 KG/M2 | HEIGHT: 68 IN | DIASTOLIC BLOOD PRESSURE: 73 MMHG | RESPIRATION RATE: 18 BRPM | HEART RATE: 78 BPM | OXYGEN SATURATION: 95 % | SYSTOLIC BLOOD PRESSURE: 137 MMHG | WEIGHT: 182.4 LBS

## 2025-08-26 DIAGNOSIS — R06.02 SOB (SHORTNESS OF BREATH) ON EXERTION: Primary | Chronic | ICD-10-CM

## 2025-08-26 DIAGNOSIS — R06.83 SNORING: ICD-10-CM

## 2025-08-26 DIAGNOSIS — G47.10 HYPERSOMNIA: ICD-10-CM

## 2025-08-26 PROCEDURE — 99204 OFFICE O/P NEW MOD 45 MIN: CPT | Performed by: NURSE PRACTITIONER

## 2025-08-26 PROCEDURE — G8433 SCR FOR DEP NOT CPT DOC RSN: HCPCS | Performed by: NURSE PRACTITIONER

## 2025-08-26 PROCEDURE — 1160F RVW MEDS BY RX/DR IN RCRD: CPT | Performed by: NURSE PRACTITIONER

## 2025-08-26 PROCEDURE — 1159F MED LIST DOCD IN RCRD: CPT | Performed by: NURSE PRACTITIONER

## 2025-08-26 PROCEDURE — 3075F SYST BP GE 130 - 139MM HG: CPT | Performed by: NURSE PRACTITIONER

## 2025-08-26 PROCEDURE — 3078F DIAST BP <80 MM HG: CPT | Performed by: NURSE PRACTITIONER

## 2025-08-26 RX ORDER — ALBUTEROL SULFATE 0.83 MG/ML
3 SOLUTION RESPIRATORY (INHALATION) ONCE
OUTPATIENT
Start: 2025-08-26 | End: 2025-08-26

## 2025-08-26 RX ORDER — ALBUTEROL SULFATE 90 UG/1
1 INHALANT RESPIRATORY (INHALATION) ONCE
OUTPATIENT
Start: 2025-08-26

## 2025-08-26 ASSESSMENT — COLUMBIA-SUICIDE SEVERITY RATING SCALE - C-SSRS
6. HAVE YOU EVER DONE ANYTHING, STARTED TO DO ANYTHING, OR PREPARED TO DO ANYTHING TO END YOUR LIFE?: NO
1. IN THE PAST MONTH, HAVE YOU WISHED YOU WERE DEAD OR WISHED YOU COULD GO TO SLEEP AND NOT WAKE UP?: NO
2. HAVE YOU ACTUALLY HAD ANY THOUGHTS OF KILLING YOURSELF?: NO

## 2025-08-26 ASSESSMENT — ENCOUNTER SYMPTOMS
LOSS OF SENSATION IN FEET: 0
DEPRESSION: 0
APNEA: 1
RHINORRHEA: 1
OCCASIONAL FEELINGS OF UNSTEADINESS: 0
SHORTNESS OF BREATH: 1

## 2025-08-26 ASSESSMENT — PATIENT HEALTH QUESTIONNAIRE - PHQ9
1. LITTLE INTEREST OR PLEASURE IN DOING THINGS: NOT AT ALL
SUM OF ALL RESPONSES TO PHQ9 QUESTIONS 1 AND 2: 0
2. FEELING DOWN, DEPRESSED OR HOPELESS: NOT AT ALL

## 2025-08-28 ENCOUNTER — HOSPITAL ENCOUNTER (OUTPATIENT)
Dept: CARDIOLOGY | Facility: CLINIC | Age: 75
Discharge: HOME | End: 2025-08-28
Payer: MEDICARE

## 2025-08-28 VITALS
HEIGHT: 68 IN | BODY MASS INDEX: 27.58 KG/M2 | WEIGHT: 182 LBS | SYSTOLIC BLOOD PRESSURE: 137 MMHG | DIASTOLIC BLOOD PRESSURE: 73 MMHG

## 2025-08-28 DIAGNOSIS — R06.02 SOB (SHORTNESS OF BREATH) ON EXERTION: Chronic | ICD-10-CM

## 2025-08-28 DIAGNOSIS — I25.10 ARTERIOSCLEROSIS OF CORONARY ARTERY: Chronic | ICD-10-CM

## 2025-08-28 DIAGNOSIS — E85.89 OTHER AMYLOIDOSIS (MULTI): ICD-10-CM

## 2025-08-28 PROCEDURE — 93306 TTE W/DOPPLER COMPLETE: CPT

## 2025-08-28 PROCEDURE — 93306 TTE W/DOPPLER COMPLETE: CPT | Performed by: INTERNAL MEDICINE

## 2025-08-28 PROCEDURE — 93356 MYOCRD STRAIN IMG SPCKL TRCK: CPT | Performed by: INTERNAL MEDICINE

## 2025-08-29 LAB
EJECTION FRACTION APICAL 4 CHAMBER: 38.1
EJECTION FRACTION: 63 %
GLOBAL LONGITUDINAL STRAIN: 20.9 %
LEFT ATRIUM VOLUME AREA LENGTH INDEX BSA: 36.7 ML/M2
LEFT VENTRICLE INTERNAL DIMENSION DIASTOLE: 4.8 CM (ref 3.5–6)
LEFT VENTRICULAR OUTFLOW TRACT DIAMETER: 2.23 CM
MITRAL VALVE E/A RATIO: 0.71
RIGHT VENTRICLE FREE WALL PEAK S': 1.3 CM/S
RIGHT VENTRICLE PEAK SYSTOLIC PRESSURE: 30 MMHG
TRICUSPID ANNULAR PLANE SYSTOLIC EXCURSION: 3.3 CM

## 2025-09-03 ENCOUNTER — TELEPHONE (OUTPATIENT)
Dept: CARDIOLOGY | Facility: CLINIC | Age: 75
End: 2025-09-03
Payer: MEDICARE

## 2025-09-16 ENCOUNTER — APPOINTMENT (OUTPATIENT)
Dept: CARDIOLOGY | Facility: CLINIC | Age: 75
End: 2025-09-16
Payer: MEDICARE

## 2025-10-21 ENCOUNTER — APPOINTMENT (OUTPATIENT)
Facility: CLINIC | Age: 75
End: 2025-10-21
Payer: MEDICARE

## 2026-01-15 ENCOUNTER — APPOINTMENT (OUTPATIENT)
Dept: PRIMARY CARE | Facility: CLINIC | Age: 76
End: 2026-01-15
Payer: MEDICARE

## 2026-01-27 ENCOUNTER — APPOINTMENT (OUTPATIENT)
Dept: CARDIOLOGY | Facility: CLINIC | Age: 76
End: 2026-01-27
Payer: MEDICARE

## (undated) DEVICE — Device

## (undated) DEVICE — DRAPE, INCISE, ANTIMICROBIAL, IOBAN 2, LARGE, 17 X 23 IN, DISPOSABLE, STERILE

## (undated) DEVICE — NEEDLE, HYPODERMIC, 25 G X 2 IN

## (undated) DEVICE — DRESSING, TRANSPARENT, TEGADERM, 4 X 4-3/4 IN

## (undated) DEVICE — STRIP, SKIN CLOSURE, STERI STRIP, REINFORCED, 0.5 X 4 IN

## (undated) DEVICE — SLEEVE, VASO PRESS, CALF GARMENT, MEDIUM, GREEN

## (undated) DEVICE — DRESSING, TRANSPARENT, TEGADERM, 2-3/8 X 2-3/4 IN

## (undated) DEVICE — DRESSING, TELFA, 3X4